# Patient Record
Sex: FEMALE | Race: WHITE | Employment: OTHER | URBAN - METROPOLITAN AREA
[De-identification: names, ages, dates, MRNs, and addresses within clinical notes are randomized per-mention and may not be internally consistent; named-entity substitution may affect disease eponyms.]

---

## 2017-02-09 ENCOUNTER — HOSPITAL ENCOUNTER (OUTPATIENT)
Dept: ULTRASOUND IMAGING | Age: 62
Discharge: HOME | End: 2017-02-09
Attending: INTERNAL MEDICINE
Payer: COMMERCIAL

## 2017-02-09 DIAGNOSIS — R09.89 RIGHT CAROTID BRUIT: ICD-10-CM

## 2017-02-09 PROCEDURE — 93880 EXTRACRANIAL BILAT STUDY: CPT

## 2017-02-24 ENCOUNTER — HOSPITAL ENCOUNTER (OUTPATIENT)
Dept: MAMMOGRAPHY | Age: 62
Discharge: HOME | End: 2017-02-24
Attending: INTERNAL MEDICINE
Payer: COMMERCIAL

## 2017-02-24 DIAGNOSIS — Z12.31 ENCOUNTER FOR SCREENING MAMMOGRAM FOR MALIGNANT NEOPLASM OF BREAST: ICD-10-CM

## 2017-02-24 PROCEDURE — 77067 SCR MAMMO BI INCL CAD: CPT

## 2017-05-17 PROCEDURE — 88305 TISSUE EXAM BY PATHOLOGIST: CPT | Performed by: INTERNAL MEDICINE

## 2017-10-10 ENCOUNTER — IMMUNIZATION (OUTPATIENT)
Dept: FAMILY MEDICINE CLINIC | Facility: CLINIC | Age: 62
End: 2017-10-10

## 2017-10-10 DIAGNOSIS — Z23 NEED FOR VACCINATION: ICD-10-CM

## 2017-10-10 PROCEDURE — 90686 IIV4 VACC NO PRSV 0.5 ML IM: CPT | Performed by: NURSE PRACTITIONER

## 2017-10-10 PROCEDURE — 90471 IMMUNIZATION ADMIN: CPT | Performed by: NURSE PRACTITIONER

## 2018-03-05 ENCOUNTER — OFFICE VISIT (OUTPATIENT)
Dept: FAMILY MEDICINE CLINIC | Facility: CLINIC | Age: 63
End: 2018-03-05

## 2018-03-05 VITALS
BODY MASS INDEX: 25.27 KG/M2 | TEMPERATURE: 98 F | WEIGHT: 161 LBS | SYSTOLIC BLOOD PRESSURE: 143 MMHG | HEIGHT: 67 IN | HEART RATE: 71 BPM | DIASTOLIC BLOOD PRESSURE: 84 MMHG

## 2018-03-05 DIAGNOSIS — E03.9 HYPOTHYROIDISM, UNSPECIFIED TYPE: ICD-10-CM

## 2018-03-05 DIAGNOSIS — Z12.31 SCREENING MAMMOGRAM, ENCOUNTER FOR: ICD-10-CM

## 2018-03-05 DIAGNOSIS — R03.0 ELEVATED BLOOD PRESSURE READING: ICD-10-CM

## 2018-03-05 DIAGNOSIS — E78.5 HYPERLIPIDEMIA, UNSPECIFIED HYPERLIPIDEMIA TYPE: Primary | ICD-10-CM

## 2018-03-05 PROCEDURE — 99212 OFFICE O/P EST SF 10 MIN: CPT | Performed by: FAMILY MEDICINE

## 2018-03-05 PROCEDURE — 99203 OFFICE O/P NEW LOW 30 MIN: CPT | Performed by: FAMILY MEDICINE

## 2018-03-05 RX ORDER — OMEGA-3-ACID ETHYL ESTERS 1 G/1
1 CAPSULE, LIQUID FILLED ORAL 2 TIMES DAILY
COMMUNITY

## 2018-03-05 RX ORDER — LEVOTHYROXINE SODIUM 0.12 MG/1
125 TABLET ORAL
Qty: 30 TABLET | Refills: 11 | Status: SHIPPED | OUTPATIENT
Start: 2018-03-05 | End: 2019-03-06

## 2018-03-05 RX ORDER — ATORVASTATIN CALCIUM 20 MG/1
20 TABLET, FILM COATED ORAL NIGHTLY
Qty: 30 TABLET | Refills: 11 | Status: SHIPPED | OUTPATIENT
Start: 2018-03-05 | End: 2019-03-15

## 2018-03-05 RX ORDER — ATORVASTATIN CALCIUM 20 MG/1
20 TABLET, FILM COATED ORAL NIGHTLY
COMMUNITY
End: 2018-03-05

## 2018-03-05 RX ORDER — LEVOTHYROXINE SODIUM 0.12 MG/1
125 TABLET ORAL
COMMUNITY
End: 2018-03-05

## 2018-03-05 NOTE — PROGRESS NOTES
Aren Petty is a 57year old female. Patient presents with:  Hyperlipidemia: New patient and medication refill    HPI:   Here for regular new patient - needs refills. Exercises regularly now since retired. Denies any SOB, CP  Has no complaints. Future  - VITAMIN D, 25-HYDROXY; Future  - ASSAY, THYROID STIM HORMONE; Future  - FREE T4 (FREE THYROXINE); Future    3. Screening mammogram, encounter for    - [facility] SCREENING BILAT (CPT=77067); Future    4.  Elevated blood pressure reading  BP elevated - di

## 2018-03-09 ENCOUNTER — LAB ENCOUNTER (OUTPATIENT)
Dept: LAB | Age: 63
End: 2018-03-09
Attending: FAMILY MEDICINE
Payer: COMMERCIAL

## 2018-03-09 DIAGNOSIS — E03.9 HYPOTHYROIDISM, UNSPECIFIED TYPE: ICD-10-CM

## 2018-03-09 DIAGNOSIS — E78.5 HYPERLIPIDEMIA, UNSPECIFIED HYPERLIPIDEMIA TYPE: ICD-10-CM

## 2018-03-09 LAB
ALBUMIN SERPL BCP-MCNC: 4.3 G/DL (ref 3.5–4.8)
ALBUMIN/GLOB SERPL: 1.5 {RATIO} (ref 1–2)
ALP SERPL-CCNC: 58 U/L (ref 32–100)
ALT SERPL-CCNC: 20 U/L (ref 14–54)
ANION GAP SERPL CALC-SCNC: 9 MMOL/L (ref 0–18)
AST SERPL-CCNC: 22 U/L (ref 15–41)
BASOPHILS # BLD: 0 K/UL (ref 0–0.2)
BASOPHILS NFR BLD: 1 %
BILIRUB SERPL-MCNC: 0.9 MG/DL (ref 0.3–1.2)
BUN SERPL-MCNC: 20 MG/DL (ref 8–20)
BUN/CREAT SERPL: 17.4 (ref 10–20)
CALCIUM SERPL-MCNC: 10 MG/DL (ref 8.5–10.5)
CHLORIDE SERPL-SCNC: 103 MMOL/L (ref 95–110)
CHOLEST SERPL-MCNC: 167 MG/DL (ref 110–200)
CO2 SERPL-SCNC: 29 MMOL/L (ref 22–32)
CREAT SERPL-MCNC: 1.15 MG/DL (ref 0.5–1.5)
EOSINOPHIL # BLD: 0.1 K/UL (ref 0–0.7)
EOSINOPHIL NFR BLD: 2 %
ERYTHROCYTE [DISTWIDTH] IN BLOOD BY AUTOMATED COUNT: 13.4 % (ref 11–15)
GLOBULIN PLAS-MCNC: 2.8 G/DL (ref 2.5–3.7)
GLUCOSE SERPL-MCNC: 103 MG/DL (ref 70–99)
HCT VFR BLD AUTO: 43.2 % (ref 35–48)
HDLC SERPL-MCNC: 37 MG/DL
HGB BLD-MCNC: 14.5 G/DL (ref 12–16)
LDLC SERPL CALC-MCNC: 98 MG/DL (ref 0–99)
LYMPHOCYTES # BLD: 2 K/UL (ref 1–4)
LYMPHOCYTES NFR BLD: 27 %
MCH RBC QN AUTO: 29.9 PG (ref 27–32)
MCHC RBC AUTO-ENTMCNC: 33.6 G/DL (ref 32–37)
MCV RBC AUTO: 89 FL (ref 80–100)
MONOCYTES # BLD: 0.5 K/UL (ref 0–1)
MONOCYTES NFR BLD: 6 %
NEUTROPHILS # BLD AUTO: 4.8 K/UL (ref 1.8–7.7)
NEUTROPHILS NFR BLD: 65 %
NONHDLC SERPL-MCNC: 130 MG/DL
OSMOLALITY UR CALC.SUM OF ELEC: 295 MOSM/KG (ref 275–295)
PATIENT FASTING: YES
PLATELET # BLD AUTO: 219 K/UL (ref 140–400)
PMV BLD AUTO: 8.7 FL (ref 7.4–10.3)
POTASSIUM SERPL-SCNC: 4 MMOL/L (ref 3.3–5.1)
PROT SERPL-MCNC: 7.1 G/DL (ref 5.9–8.4)
RBC # BLD AUTO: 4.86 M/UL (ref 3.7–5.4)
SODIUM SERPL-SCNC: 141 MMOL/L (ref 136–144)
T4 FREE SERPL-MCNC: 1.47 NG/DL (ref 0.58–1.64)
TRIGL SERPL-MCNC: 161 MG/DL (ref 1–149)
TSH SERPL-ACNC: 1.84 UIU/ML (ref 0.45–5.33)
VIT B12 SERPL-MCNC: 326 PG/ML (ref 181–914)
WBC # BLD AUTO: 7.5 K/UL (ref 4–11)

## 2018-03-09 PROCEDURE — 82306 VITAMIN D 25 HYDROXY: CPT

## 2018-03-09 PROCEDURE — 84443 ASSAY THYROID STIM HORMONE: CPT

## 2018-03-09 PROCEDURE — 80061 LIPID PANEL: CPT

## 2018-03-09 PROCEDURE — 36415 COLL VENOUS BLD VENIPUNCTURE: CPT

## 2018-03-09 PROCEDURE — 85025 COMPLETE CBC W/AUTO DIFF WBC: CPT

## 2018-03-09 PROCEDURE — 80050 GENERAL HEALTH PANEL: CPT

## 2018-03-09 PROCEDURE — 84439 ASSAY OF FREE THYROXINE: CPT

## 2018-03-09 PROCEDURE — 82607 VITAMIN B-12: CPT

## 2018-03-11 NOTE — PROGRESS NOTES
Tests are all stable. Please continue with current treatment plan.  Continue current medications. - Dr. Briana Goss

## 2018-03-12 LAB — 25(OH)D3 SERPL-MCNC: 32.5 NG/ML

## 2018-08-01 ENCOUNTER — TELEPHONE (OUTPATIENT)
Dept: FAMILY MEDICINE CLINIC | Facility: CLINIC | Age: 63
End: 2018-08-01

## 2018-08-01 DIAGNOSIS — Z78.0 POST-MENOPAUSAL: Primary | ICD-10-CM

## 2018-08-01 NOTE — TELEPHONE ENCOUNTER
[facility] asking for a order for pt to have Her Dexa Scan but there Is no order on her chart       Please advise

## 2018-08-13 ENCOUNTER — HOSPITAL ENCOUNTER (OUTPATIENT)
Dept: BONE DENSITY | Age: 63
Discharge: HOME | End: 2018-08-13
Attending: FAMILY MEDICINE
Payer: MEDICARE

## 2018-08-13 DIAGNOSIS — Z78.0 POST-MENOPAUSAL: ICD-10-CM

## 2018-08-13 PROCEDURE — 77080 DXA BONE DENSITY AXIAL: CPT | Performed by: FAMILY MEDICINE

## 2018-08-14 ENCOUNTER — HOSPITAL ENCOUNTER (OUTPATIENT)
Dept: MAMMOGRAPHY | Age: 63
Discharge: HOME | End: 2018-08-14
Attending: FAMILY MEDICINE
Payer: MEDICARE

## 2018-08-14 DIAGNOSIS — Z12.39 BREAST CANCER SCREENING: ICD-10-CM

## 2018-08-14 DIAGNOSIS — Z12.31 SCREENING MAMMOGRAM, ENCOUNTER FOR: ICD-10-CM

## 2018-08-14 PROCEDURE — 77067 SCR MAMMO BI INCL CAD: CPT | Performed by: FAMILY MEDICINE

## 2018-08-16 NOTE — PROGRESS NOTES
Dexascan shows mild bone loss. Continue with calcium twice a day and vitamin D supplements.  Also increase weight bearing exercise like walking, aerobics. - Dr. Raquel Roman

## 2018-08-21 ENCOUNTER — LAB ENCOUNTER (OUTPATIENT)
Dept: LAB | Age: 63
End: 2018-08-21
Attending: FAMILY MEDICINE
Payer: MEDICARE

## 2018-08-21 ENCOUNTER — OFFICE VISIT (OUTPATIENT)
Dept: FAMILY MEDICINE CLINIC | Facility: CLINIC | Age: 63
End: 2018-08-21
Payer: MEDICARE

## 2018-08-21 VITALS
HEIGHT: 65 IN | HEART RATE: 60 BPM | DIASTOLIC BLOOD PRESSURE: 76 MMHG | SYSTOLIC BLOOD PRESSURE: 136 MMHG | BODY MASS INDEX: 26.16 KG/M2 | WEIGHT: 157 LBS

## 2018-08-21 DIAGNOSIS — L98.9 SKIN LESION: ICD-10-CM

## 2018-08-21 DIAGNOSIS — E78.2 MIXED HYPERLIPIDEMIA: ICD-10-CM

## 2018-08-21 DIAGNOSIS — Z00.00 ENCOUNTER FOR ANNUAL HEALTH EXAMINATION: ICD-10-CM

## 2018-08-21 DIAGNOSIS — I83.813 VARICOSE VEINS OF BOTH LOWER EXTREMITIES WITH PAIN: ICD-10-CM

## 2018-08-21 DIAGNOSIS — Z23 NEED FOR VACCINATION: ICD-10-CM

## 2018-08-21 DIAGNOSIS — Z00.00 ROUTINE GENERAL MEDICAL EXAMINATION AT A HEALTH CARE FACILITY: Primary | ICD-10-CM

## 2018-08-21 DIAGNOSIS — E03.8 OTHER SPECIFIED HYPOTHYROIDISM: ICD-10-CM

## 2018-08-21 DIAGNOSIS — E03.8 OTHER SPECIFIED HYPOTHYROIDISM: Primary | ICD-10-CM

## 2018-08-21 LAB
ALBUMIN SERPL BCP-MCNC: 4.2 G/DL (ref 3.5–4.8)
ALBUMIN/GLOB SERPL: 1.4 {RATIO} (ref 1–2)
ALP SERPL-CCNC: 58 U/L (ref 32–100)
ALT SERPL-CCNC: 19 U/L (ref 14–54)
ANION GAP SERPL CALC-SCNC: 7 MMOL/L (ref 0–18)
AST SERPL-CCNC: 23 U/L (ref 15–41)
BILIRUB SERPL-MCNC: 0.8 MG/DL (ref 0.3–1.2)
BUN SERPL-MCNC: 20 MG/DL (ref 8–20)
BUN/CREAT SERPL: 16.9 (ref 10–20)
CALCIUM SERPL-MCNC: 10.1 MG/DL (ref 8.5–10.5)
CHLORIDE SERPL-SCNC: 106 MMOL/L (ref 95–110)
CHOLEST SERPL-MCNC: 164 MG/DL (ref 110–200)
CO2 SERPL-SCNC: 29 MMOL/L (ref 22–32)
CREAT SERPL-MCNC: 1.18 MG/DL (ref 0.5–1.5)
GLOBULIN PLAS-MCNC: 3.1 G/DL (ref 2.5–3.7)
GLUCOSE SERPL-MCNC: 94 MG/DL (ref 70–99)
HDLC SERPL-MCNC: 42 MG/DL
LDLC SERPL CALC-MCNC: 97 MG/DL (ref 0–99)
NONHDLC SERPL-MCNC: 122 MG/DL
OSMOLALITY UR CALC.SUM OF ELEC: 296 MOSM/KG (ref 275–295)
PATIENT FASTING: YES
POTASSIUM SERPL-SCNC: 4.2 MMOL/L (ref 3.3–5.1)
PROT SERPL-MCNC: 7.3 G/DL (ref 5.9–8.4)
SODIUM SERPL-SCNC: 142 MMOL/L (ref 136–144)
T4 FREE SERPL-MCNC: 1.3 NG/DL (ref 0.58–1.64)
TRIGL SERPL-MCNC: 123 MG/DL (ref 1–149)
TSH SERPL-ACNC: 0.24 UIU/ML (ref 0.45–5.33)

## 2018-08-21 PROCEDURE — 93005 ELECTROCARDIOGRAM TRACING: CPT

## 2018-08-21 PROCEDURE — 90670 PCV13 VACCINE IM: CPT | Performed by: FAMILY MEDICINE

## 2018-08-21 PROCEDURE — 80053 COMPREHEN METABOLIC PANEL: CPT

## 2018-08-21 PROCEDURE — 93010 ELECTROCARDIOGRAM REPORT: CPT | Performed by: FAMILY MEDICINE

## 2018-08-21 PROCEDURE — 84443 ASSAY THYROID STIM HORMONE: CPT

## 2018-08-21 PROCEDURE — G0009 ADMIN PNEUMOCOCCAL VACCINE: HCPCS | Performed by: FAMILY MEDICINE

## 2018-08-21 PROCEDURE — 80061 LIPID PANEL: CPT

## 2018-08-21 PROCEDURE — G0402 INITIAL PREVENTIVE EXAM: HCPCS | Performed by: FAMILY MEDICINE

## 2018-08-21 PROCEDURE — 84439 ASSAY OF FREE THYROXINE: CPT

## 2018-08-21 PROCEDURE — 36415 COLL VENOUS BLD VENIPUNCTURE: CPT

## 2018-08-21 NOTE — PROGRESS NOTES
HPI:   Cristian Rowell is a 70year old female who presents for a Medicare Initial Annual Wellness visit (Once after 12 month Medicare anniversary) . Pt here for regular physical - first medicare physical. Stays active with grand kids.  Taking yoga on Encounters:  08/21/18 : 157 lb (71.2 kg)  03/05/18 : 161 lb (73 kg)  04/06/17 : 155 lb (70.3 kg)     Last Cholesterol Labs:     Lab Results  Component Value Date   CHOLEST 167 03/09/2018   HDL 37 03/09/2018   LDL 98 03/09/2018   TRIG 161 (H) 03/09/2018 exertion  CARDIOVASCULAR: denies chest pain on exertion  GI: denies abdominal pain, denies heartburn  : denies dysuria, vaginal discharge or itching, no complaint of urinary incontinence   MUSCULOSKELETAL: denies back pain  NEURO: denies headaches  PSYCH Visual Acuity                           General Appearance:  Alert, cooperative, no distress, appears stated age   Head:  Normocephalic, without obvious abnormality, atraumatic   Eyes:  PERRL, conjunctiva/corneas clear, EOM's intact both eyes   Ears: FOR INITIAL PREVENT EXAM    4. Need for vaccination    - PNEUMOCOCCAL VACC, 13 KAYDEN IM    5. Varicose veins of both lower extremities with pain  Compression stockings and referral for treatment.    - OP REFERRAL TO INTERVENTIONAL RADIOLOGY    6. Skin lesion flowsheet data found. Glaucoma Screening      Ophthalmology Visit Annually: Diabetics, FHx Glaucoma, AA>50, > 65 No flowsheet data found.     Bone Density Screening      Dexascan Every two years Last Dexa Scan:   XR DEXA BONE DENSITOMETRY (CPT=77

## 2018-08-21 NOTE — PATIENT INSTRUCTIONS
Nneka Britt's SCREENING SCHEDULE   Tests on this list are recommended by your physician but may not be covered, or covered at this frequency, by your insurer. Please check with your insurance carrier before scheduling to verify coverage.    JEREMÍAS more often if abnormal Colonoscopy due on 05/17/2022 Update Health Maintenance if applicable    Flex Sigmoidoscopy Screen  Covered every 5 years No results found for this or any previous visit. No flowsheet data found.      Fecal Occult Blood   Covered Joelle Pneumococcal 23 (Pneumovax)  Covered Once after 65 No orders found for this or any previous visit. Please get once after your 65th birthday    Hepatitis B for Moderate/High Risk       No orders found for this or any previous visit.  Medium/high risk factors

## 2018-08-26 NOTE — PROGRESS NOTES
Labs are all stable. TSH is a bit low meaning your thyroid dose may be too high. I do not want to adjust it yet but will recheck labs in 2-3 months for the thyroid to decide. I placed the orders.  Your cholesterol is stable on the current medications. -

## 2018-08-30 ENCOUNTER — TELEPHONE (OUTPATIENT)
Dept: OTHER | Age: 63
End: 2018-08-30

## 2018-08-31 NOTE — TELEPHONE ENCOUNTER
Referral Request     From  Marisol Saldivar To Sent  8/30/2018  8:35 AM   I am waiting for authorization numbers for OP Interventional Radiology for varicose veins and for Dr. Jeremias Morse, dermatology.  I have Medicare A and B and [facility] in

## 2018-09-04 NOTE — TELEPHONE ENCOUNTER
Looks like both authorized. I do not think she needs a number now - please look at referrals and confirm that and call pt.

## 2018-09-06 NOTE — TELEPHONE ENCOUNTER
Called pt and lmtcb, she does not need any authorization to see both specialist as along as they accept her insurance. She can call to make appt with each doctor.

## 2018-09-07 NOTE — TELEPHONE ENCOUNTER
Patient returned call, advised of notes below with understanding. No further questions at this time.

## 2018-10-01 ENCOUNTER — IMMUNIZATION (OUTPATIENT)
Dept: FAMILY MEDICINE CLINIC | Facility: CLINIC | Age: 63
End: 2018-10-01
Payer: MEDICARE

## 2018-10-01 PROCEDURE — 90653 IIV ADJUVANT VACCINE IM: CPT | Performed by: FAMILY MEDICINE

## 2018-10-01 PROCEDURE — G0008 ADMIN INFLUENZA VIRUS VAC: HCPCS | Performed by: FAMILY MEDICINE

## 2018-10-26 DIAGNOSIS — I83.813 VARICOSE VEINS OF BOTH LOWER EXTREMITIES WITH PAIN: Primary | ICD-10-CM

## 2018-10-30 ENCOUNTER — OFFICE VISIT (OUTPATIENT)
Dept: DERMATOLOGY CLINIC | Facility: CLINIC | Age: 63
End: 2018-10-30
Payer: MEDICARE

## 2018-10-30 DIAGNOSIS — D48.5 NEOPLASM OF UNCERTAIN BEHAVIOR OF SKIN: Primary | ICD-10-CM

## 2018-10-30 PROCEDURE — 11100 BIOPSY OF SKIN LESION: CPT | Performed by: DERMATOLOGY

## 2018-10-30 PROCEDURE — 88305 TISSUE EXAM BY PATHOLOGIST: CPT | Performed by: DERMATOLOGY

## 2018-10-30 NOTE — PROGRESS NOTES
Past Medical History:   Diagnosis Date   • Hyperlipidemia    • Hypothyroid      Past Surgical History:   Procedure Laterality Date   • COLONOSCOPY  5/17   • HYSTERECTOMY  12/2013   • OTHER SURGICAL HISTORY  1989    laparascopy   • TONSILLECTOMY  1959 S

## 2018-10-30 NOTE — PROGRESS NOTES
HPI:     Chief Complaint     Derm Problem        HPI     Derm Problem      Additional comments: New pt. Pt presents with red spot on right sideburn, present x 6 months. Pt states she has accidentally scratched it with hairbrush.  Pt denies personal and fami name: Not on file      Number of children: Not on file      Years of education: Not on file      Highest education level: Not on file    Social Needs      Financial resource strain: Not on file      Food insecurity - worry: Not on file      Food insecurity [IHP Pt to [place]      Results From Past 48 Hours:  No results found for this or any previous visit (from the past 48 hour(s)).     Meds This Visit:      Imaging Orders:  None     Referral Orders:  No orders of the defined types were placed in this enc

## 2018-11-02 NOTE — PROGRESS NOTES
Logged in path book and added to pmh. Pt informed of lab results and lss reccs. Voiced understanding. No questions at this time.

## 2018-11-12 ENCOUNTER — OFFICE VISIT (OUTPATIENT)
Dept: SURGERY | Facility: CLINIC | Age: 63
End: 2018-11-12
Payer: MEDICARE

## 2018-11-12 DIAGNOSIS — C44.319 BASAL CELL CARCINOMA OF SKIN OF OTHER PARTS OF FACE: Primary | ICD-10-CM

## 2018-11-12 PROCEDURE — 99203 OFFICE O/P NEW LOW 30 MIN: CPT | Performed by: PLASTIC SURGERY

## 2018-11-12 PROCEDURE — G0463 HOSPITAL OUTPT CLINIC VISIT: HCPCS | Performed by: PLASTIC SURGERY

## 2018-11-12 RX ORDER — HYDROCODONE BITARTRATE AND ACETAMINOPHEN 7.5; 325 MG/1; MG/1
1 TABLET ORAL
Qty: 10 TABLET | Refills: 0 | Status: SHIPPED | OUTPATIENT
Start: 2018-11-12 | End: 2018-12-07

## 2018-11-12 NOTE — PROGRESS NOTES
Pt request for surgery signed by pt and witnessed and signed by RN. Prescription for Norco 7.5 mg and narcotic hand-out instruction sheet given to and reviewed w/patient. Pre-Surgical Instruction Handout given to and reviewed w/pt.   All questions and con

## 2018-11-12 NOTE — H&P (VIEW-ONLY)
Dani Martinez is a 70year old female that presents with Patient presents with:  Lesion: R cheek, BCC  .     REFERRED BY:  Lucho Terry    Pacemaker: No  Latex Allergy: no  Coumadin: No  Plavix: No  Other anticoagulants: No  Cardiac stents: No    HAND D Hyperlipidemia    • Hypothyroid      Past Surgical History:   Procedure Laterality Date   • COLONOSCOPY  5/17   • HYSTERECTOMY  12/2013   • OTHER SURGICAL HISTORY  1989    laparascopy   • TONSILLECTOMY  1959     Social History    Socioeconomic History Left: Normal  NECK/THYROID: Inspection - Normal, Palpation - Normal, Thyroid gland - Normal, No adenopathy  RESPIRATORY: Inspection - Normal, Effort - Normal  CARDIOVASCULAR: Regular rhythm, No murmurs  ABDOMEN: Inspection - Normal, No abdominal tenderness

## 2018-11-12 NOTE — H&P
Chantelle Fernandez is a 70year old female that presents with Patient presents with:  Lesion: R cheek, BCC  .     REFERRED BY:  Antonia Reina    Pacemaker: No  Latex Allergy: no  Coumadin: No  Plavix: No  Other anticoagulants: No  Cardiac stents: No    HAND D Hyperlipidemia    • Hypothyroid      Past Surgical History:   Procedure Laterality Date   • COLONOSCOPY  5/17   • HYSTERECTOMY  12/2013   • OTHER SURGICAL HISTORY  1989    laparascopy   • TONSILLECTOMY  1959     Social History    Socioeconomic History Left: Normal  NECK/THYROID: Inspection - Normal, Palpation - Normal, Thyroid gland - Normal, No adenopathy  RESPIRATORY: Inspection - Normal, Effort - Normal  CARDIOVASCULAR: Regular rhythm, No murmurs  ABDOMEN: Inspection - Normal, No abdominal tenderness

## 2018-11-16 ENCOUNTER — HOSPITAL ENCOUNTER (OUTPATIENT)
Dept: ULTRASOUND IMAGING | Facility: HOSPITAL | Age: 63
Discharge: HOME | End: 2018-11-16
Attending: RADIOLOGY
Payer: MEDICARE

## 2018-11-16 DIAGNOSIS — I83.813 VARICOSE VEINS OF BOTH LOWER EXTREMITIES WITH PAIN: ICD-10-CM

## 2018-11-16 PROCEDURE — 93970 EXTREMITY STUDY: CPT | Performed by: RADIOLOGY

## 2018-11-26 ENCOUNTER — TELEPHONE (OUTPATIENT)
Dept: SURGERY | Facility: CLINIC | Age: 63
End: 2018-11-26

## 2018-11-26 DIAGNOSIS — C44.319 BASAL CELL CARCINOMA (BCC) OF SKIN OF OTHER PART OF FACE: Primary | ICD-10-CM

## 2018-11-26 NOTE — TELEPHONE ENCOUNTER
Surgery is scheduled for 12/4/18  Insurance is [employer] [facility]  Thank you!

## 2018-11-26 NOTE — TELEPHONE ENCOUNTER
Surgery is scheduled for 12/4/18  Insurance is Lebron Perez and [facility]  Thank you!

## 2018-12-04 ENCOUNTER — ANESTHESIA EVENT (OUTPATIENT)
Dept: SURGERY | Facility: HOSPITAL | Age: 63
End: 2018-12-04
Payer: MEDICARE

## 2018-12-04 ENCOUNTER — HOSPITAL DOCUMENTATION (OUTPATIENT)
Dept: SURGERY | Facility: CLINIC | Age: 63
End: 2018-12-04

## 2018-12-04 ENCOUNTER — HOSPITAL ENCOUNTER (OUTPATIENT)
Facility: HOSPITAL | Age: 63
Setting detail: HOSPITAL OUTPATIENT SURGERY
Discharge: HOME | End: 2018-12-04
Attending: PLASTIC SURGERY | Admitting: PLASTIC SURGERY
Payer: MEDICARE

## 2018-12-04 ENCOUNTER — ANESTHESIA (OUTPATIENT)
Dept: SURGERY | Facility: HOSPITAL | Age: 63
End: 2018-12-04
Payer: MEDICARE

## 2018-12-04 VITALS
DIASTOLIC BLOOD PRESSURE: 87 MMHG | RESPIRATION RATE: 15 BRPM | HEART RATE: 59 BPM | WEIGHT: 159 LBS | HEIGHT: 65.5 IN | SYSTOLIC BLOOD PRESSURE: 113 MMHG | TEMPERATURE: 98 F | BODY MASS INDEX: 26.17 KG/M2 | OXYGEN SATURATION: 95 %

## 2018-12-04 DIAGNOSIS — C44.319 BASAL CELL CARCINOMA (BCC) OF SKIN OF OTHER PART OF FACE: Primary | ICD-10-CM

## 2018-12-04 PROCEDURE — 14041 TIS TRNFR F/C/C/M/N/A/G/H/F: CPT | Performed by: PLASTIC SURGERY

## 2018-12-04 PROCEDURE — 0HX1XZZ TRANSFER FACE SKIN, EXTERNAL APPROACH: ICD-10-PCS | Performed by: PLASTIC SURGERY

## 2018-12-04 PROCEDURE — 0HB1XZZ EXCISION OF FACE SKIN, EXTERNAL APPROACH: ICD-10-PCS | Performed by: PLASTIC SURGERY

## 2018-12-04 RX ORDER — FAMOTIDINE 20 MG/1
20 TABLET ORAL ONCE
Status: DISCONTINUED | OUTPATIENT
Start: 2018-12-04 | End: 2018-12-04 | Stop reason: HOSPADM

## 2018-12-04 RX ORDER — MORPHINE SULFATE 10 MG/ML
6 INJECTION, SOLUTION INTRAMUSCULAR; INTRAVENOUS EVERY 10 MIN PRN
Status: DISCONTINUED | OUTPATIENT
Start: 2018-12-04 | End: 2018-12-04

## 2018-12-04 RX ORDER — HYDROCODONE BITARTRATE AND ACETAMINOPHEN 7.5; 325 MG/1; MG/1
1 TABLET ORAL EVERY 4 HOURS PRN
Status: DISCONTINUED | OUTPATIENT
Start: 2018-12-04 | End: 2018-12-04

## 2018-12-04 RX ORDER — KETOROLAC TROMETHAMINE 30 MG/ML
INJECTION, SOLUTION INTRAMUSCULAR; INTRAVENOUS AS NEEDED
Status: DISCONTINUED | OUTPATIENT
Start: 2018-12-04 | End: 2018-12-04 | Stop reason: SURG

## 2018-12-04 RX ORDER — LIDOCAINE HYDROCHLORIDE 10 MG/ML
INJECTION, SOLUTION EPIDURAL; INFILTRATION; INTRACAUDAL; PERINEURAL AS NEEDED
Status: DISCONTINUED | OUTPATIENT
Start: 2018-12-04 | End: 2018-12-04 | Stop reason: SURG

## 2018-12-04 RX ORDER — ACETAMINOPHEN 500 MG
1000 TABLET ORAL ONCE
Status: COMPLETED | OUTPATIENT
Start: 2018-12-04 | End: 2018-12-04

## 2018-12-04 RX ORDER — ONDANSETRON 2 MG/ML
4 INJECTION INTRAMUSCULAR; INTRAVENOUS ONCE AS NEEDED
Status: DISCONTINUED | OUTPATIENT
Start: 2018-12-04 | End: 2018-12-04

## 2018-12-04 RX ORDER — HALOPERIDOL 5 MG/ML
0.25 INJECTION INTRAMUSCULAR ONCE AS NEEDED
Status: DISCONTINUED | OUTPATIENT
Start: 2018-12-04 | End: 2018-12-04

## 2018-12-04 RX ORDER — MORPHINE SULFATE 4 MG/ML
2 INJECTION, SOLUTION INTRAMUSCULAR; INTRAVENOUS EVERY 10 MIN PRN
Status: DISCONTINUED | OUTPATIENT
Start: 2018-12-04 | End: 2018-12-04

## 2018-12-04 RX ORDER — SODIUM CHLORIDE, SODIUM LACTATE, POTASSIUM CHLORIDE, CALCIUM CHLORIDE 600; 310; 30; 20 MG/100ML; MG/100ML; MG/100ML; MG/100ML
INJECTION, SOLUTION INTRAVENOUS CONTINUOUS
Status: DISCONTINUED | OUTPATIENT
Start: 2018-12-04 | End: 2018-12-04

## 2018-12-04 RX ORDER — NALOXONE HYDROCHLORIDE 0.4 MG/ML
80 INJECTION, SOLUTION INTRAMUSCULAR; INTRAVENOUS; SUBCUTANEOUS AS NEEDED
Status: DISCONTINUED | OUTPATIENT
Start: 2018-12-04 | End: 2018-12-04

## 2018-12-04 RX ORDER — METOCLOPRAMIDE 10 MG/1
10 TABLET ORAL ONCE
Status: DISCONTINUED | OUTPATIENT
Start: 2018-12-04 | End: 2018-12-04 | Stop reason: HOSPADM

## 2018-12-04 RX ORDER — MORPHINE SULFATE 4 MG/ML
4 INJECTION, SOLUTION INTRAMUSCULAR; INTRAVENOUS EVERY 10 MIN PRN
Status: DISCONTINUED | OUTPATIENT
Start: 2018-12-04 | End: 2018-12-04

## 2018-12-04 RX ORDER — HYDROCODONE BITARTRATE AND ACETAMINOPHEN 5; 325 MG/1; MG/1
2 TABLET ORAL AS NEEDED
Status: DISCONTINUED | OUTPATIENT
Start: 2018-12-04 | End: 2018-12-04

## 2018-12-04 RX ORDER — HYDROCODONE BITARTRATE AND ACETAMINOPHEN 5; 325 MG/1; MG/1
1 TABLET ORAL AS NEEDED
Status: DISCONTINUED | OUTPATIENT
Start: 2018-12-04 | End: 2018-12-04

## 2018-12-04 RX ORDER — LIDOCAINE HYDROCHLORIDE AND EPINEPHRINE 5; 5 MG/ML; UG/ML
INJECTION, SOLUTION INFILTRATION; PERINEURAL AS NEEDED
Status: DISCONTINUED | OUTPATIENT
Start: 2018-12-04 | End: 2018-12-04 | Stop reason: HOSPADM

## 2018-12-04 RX ADMIN — KETOROLAC TROMETHAMINE 30 MG: 30 INJECTION, SOLUTION INTRAMUSCULAR; INTRAVENOUS at 12:01:00

## 2018-12-04 RX ADMIN — LIDOCAINE HYDROCHLORIDE 50 MG: 10 INJECTION, SOLUTION EPIDURAL; INFILTRATION; INTRACAUDAL; PERINEURAL at 10:52:00

## 2018-12-04 RX ADMIN — SODIUM CHLORIDE, SODIUM LACTATE, POTASSIUM CHLORIDE, CALCIUM CHLORIDE: 600; 310; 30; 20 INJECTION, SOLUTION INTRAVENOUS at 10:50:00

## 2018-12-04 NOTE — ANESTHESIA PREPROCEDURE EVALUATION
Anesthesia PreOp Note    HPI:     Vanessa Peraza is a 70year old female who presents for preoperative consultation requested by: Manju Walden MD    Date of Surgery: 12/4/2018    Procedure(s):  EXCISION LESION HEAD/NECK/FACE  Indication: Basal Current Facility-Administered Medications Ordered in Epic:  lactated ringers infusion  Intravenous Continuous Roxanne Franco MD Last Rate: 20 mL/hr at 12/04/18 0917   famoTIDine (PEPCID) tab 20 mg 20 mg Oral Once Roxanne Franco MD History Narrative      Not on file      Available pre-op labs reviewed. Vital Signs: Body mass index is 26.06 kg/m². height is 1.664 m (5' 5.5\") and weight is 72.1 kg (159 lb). Her oral temperature is 98.1 °F (36.7 °C).  Her blood pressure i

## 2018-12-04 NOTE — ANESTHESIA POSTPROCEDURE EVALUATION
Patient: Halie Armas    Procedure Summary     Date:  12/04/18 Room / Location:  [facility] MAIN OR  / [facility] MAIN OR    Anesthesia Start:  9473 Anesthesia Stop:  6661    Procedure:  EXCISION LESION HEAD/NECK/FACE (Right Face) Diagnosis:  (Basal cell carcinoma

## 2018-12-04 NOTE — BRIEF OP NOTE
Pre-Operative Diagnosis: Basal cell carcinoma     Post-Operative Diagnosis: Basal cell carcinoma      Procedure Performed:   Procedure(s):   Wide excision lesion(s) right cheek, frozen section, flap reconstruction    Surgeon(s) and Role:     * Madeline BENTON

## 2018-12-04 NOTE — OPERATIVE REPORT
[facility]    PATIENT'S NAME: Tracy Hull   ATTENDING PHYSICIAN: Mary Kate Patten MD   OPERATING PHYSICIAN: Mary Kate Patten MD   PATIENT ACCOUNT#:   [id number]    LOCATION:  [place] [address]  MEDICAL RECORD #:   [id number] 12:04:26  t: 12/04/2018 13:19:21  [facility] [id number]  [facility]/    cc: MD Harry Biswas. Mesfin Gregorio, [address] [place] [address]  Briana Goss MD

## 2018-12-05 ENCOUNTER — TELEPHONE (OUTPATIENT)
Dept: SURGERY | Facility: CLINIC | Age: 63
End: 2018-12-05

## 2018-12-05 NOTE — TELEPHONE ENCOUNTER
Spoke with pt \"doing well\"  Has generalized edema and soreness to surgical site R cheek  Denies pain, did not take norco, last dose tylenol 500mg by mouth last night  Denies s/s infection, no bleeding to surgical site, steri strip dry and intact  Made aw

## 2018-12-07 ENCOUNTER — NURSE ONLY (OUTPATIENT)
Dept: SURGERY | Facility: CLINIC | Age: 63
End: 2018-12-07
Payer: MEDICARE

## 2018-12-07 DIAGNOSIS — C44.319 BASAL CELL CARCINOMA (BCC) OF SKIN OF OTHER PART OF FACE: ICD-10-CM

## 2018-12-07 DIAGNOSIS — Z48.02 ENCOUNTER FOR REMOVAL OF SUTURES: Primary | ICD-10-CM

## 2018-12-07 PROCEDURE — G0463 HOSPITAL OUTPT CLINIC VISIT: HCPCS | Performed by: PLASTIC SURGERY

## 2018-12-07 NOTE — PROGRESS NOTES
Surgery 1: R cheek BCC  - Date: 12/04/18  - Days Since: 3    Pt here for suture removal to R cheek . Pt identified w/2 identifiers and orders verified. Pt presents w/steri-strip C/D/I. Pt denies c/o pain, use of analgesics, and s/s infection.   Steri-st

## 2019-01-29 DIAGNOSIS — I87.2 VENOUS INSUFFICIENCY: Primary | ICD-10-CM

## 2019-02-21 RX ORDER — IBUPROFEN 800 MG
1 TABLET ORAL DAILY
COMMUNITY

## 2019-02-22 ENCOUNTER — HOSPITAL ENCOUNTER (OUTPATIENT)
Dept: INTERVENTIONAL RADIOLOGY/VASCULAR | Facility: HOSPITAL | Age: 64
Discharge: HOME | End: 2019-02-22
Attending: RADIOLOGY | Admitting: RADIOLOGY
Payer: MEDICARE

## 2019-02-22 VITALS
HEIGHT: 65 IN | BODY MASS INDEX: 26.66 KG/M2 | SYSTOLIC BLOOD PRESSURE: 139 MMHG | RESPIRATION RATE: 16 BRPM | WEIGHT: 160 LBS | OXYGEN SATURATION: 96 % | HEART RATE: 75 BPM | DIASTOLIC BLOOD PRESSURE: 74 MMHG

## 2019-02-22 DIAGNOSIS — I87.2 VENOUS INSUFFICIENCY: ICD-10-CM

## 2019-02-22 PROCEDURE — 99153 MOD SED SAME PHYS/QHP EA: CPT

## 2019-02-22 PROCEDURE — 3E033TZ INTRODUCTION OF DESTRUCTIVE AGENT INTO PERIPHERAL VEIN, PERCUTANEOUS APPROACH: ICD-10-PCS | Performed by: RADIOLOGY

## 2019-02-22 PROCEDURE — 99152 MOD SED SAME PHYS/QHP 5/>YRS: CPT

## 2019-02-22 PROCEDURE — 76942 ECHO GUIDE FOR BIOPSY: CPT

## 2019-02-22 PROCEDURE — 06DY3ZZ EXTRACTION OF LOWER VEIN, PERCUTANEOUS APPROACH: ICD-10-PCS | Performed by: RADIOLOGY

## 2019-02-22 PROCEDURE — 36470 NJX SCLRSNT 1 INCMPTNT VEIN: CPT

## 2019-02-22 PROCEDURE — 37765 STAB PHLEB VEINS XTR 10-20: CPT

## 2019-02-22 PROCEDURE — 36415 COLL VENOUS BLD VENIPUNCTURE: CPT

## 2019-02-22 PROCEDURE — 96365 THER/PROPH/DIAG IV INF INIT: CPT

## 2019-02-22 RX ORDER — ONDANSETRON 2 MG/ML
INJECTION INTRAMUSCULAR; INTRAVENOUS
Status: COMPLETED
Start: 2019-02-22 | End: 2019-02-22

## 2019-02-22 RX ORDER — SODIUM TETRADECYL SULFATE 30 MG/ML
INJECTION, SOLUTION INTRAVENOUS
Status: COMPLETED
Start: 2019-02-22 | End: 2019-02-22

## 2019-02-22 RX ORDER — LIDOCAINE HYDROCHLORIDE 20 MG/ML
INJECTION, SOLUTION EPIDURAL; INFILTRATION; INTRACAUDAL; PERINEURAL
Status: COMPLETED
Start: 2019-02-22 | End: 2019-02-22

## 2019-02-22 RX ORDER — SODIUM CHLORIDE 9 MG/ML
INJECTION, SOLUTION INTRAVENOUS
Status: DISCONTINUED
Start: 2019-02-22 | End: 2019-02-22

## 2019-02-22 RX ORDER — MIDAZOLAM HYDROCHLORIDE 1 MG/ML
INJECTION INTRAMUSCULAR; INTRAVENOUS
Status: COMPLETED
Start: 2019-02-22 | End: 2019-02-22

## 2019-02-22 RX ORDER — SODIUM CHLORIDE 9 MG/ML
INJECTION, SOLUTION INTRAVENOUS CONTINUOUS
Status: DISCONTINUED | OUTPATIENT
Start: 2019-02-22 | End: 2019-02-22

## 2019-02-22 RX ADMIN — ONDANSETRON: 2 INJECTION INTRAMUSCULAR; INTRAVENOUS at 13:15:00

## 2019-02-22 NOTE — PROGRESS NOTES
Pt is able to sit up and ambulate without any difficulty. Procedure site remains dry and intact with no signs of bleeding and hematoma. Instructions given. Pt/Family verbalized understanding.   Pt understood instructions and will follow up with MD in 1 week

## 2019-02-22 NOTE — PROCEDURES
[facility]  Procedure Note    [facility] Patient Status:  Outpatient    1955 MRN [id number]   Location [facility] Attending Suyapa Vazquez MD   Hosp Day # 0 PCP Iván Bunch MD     Procedu

## 2019-02-22 NOTE — H&P
[facility] Patient Status:  Outpatient    1955 MRN [id number]   Location [facility] Attending Nathan Santiago MD   Hosp Day # 0 PCP Junior Robbins MD     Ad BS+x4  Extremities: Bulging varicosities along lateral right thigh and posterior calf  Pulses: 2+ bilat DP    Results:   Labs:  No results for input(s): RBC, HGB, HCT, MCV, MCH, MCHC, RDW, NEPRELIM, WBC, PLT in the last 168 hours.   No results for input(s):

## 2019-02-22 NOTE — PRE-SEDATION ASSESSMENT
[place] [facility]  IR Pre-Procedure Sedation Assessment    History of snoring or sleep or apnea?    No    History of previous problems with anesthesia or sedation  No    Physical Findings:  Neck: nl ROM  CV: RRR  PULM: normal respiratory rate/effor

## 2019-03-01 ENCOUNTER — HOSPITAL ENCOUNTER (OUTPATIENT)
Dept: ULTRASOUND IMAGING | Facility: HOSPITAL | Age: 64
Discharge: HOME | End: 2019-03-01
Attending: CLINICAL NURSE SPECIALIST
Payer: MEDICARE

## 2019-03-01 DIAGNOSIS — I87.2 VENOUS INSUFFICIENCY: ICD-10-CM

## 2019-03-01 PROCEDURE — 93971 EXTREMITY STUDY: CPT | Performed by: CLINICAL NURSE SPECIALIST

## 2019-03-07 RX ORDER — LEVOTHYROXINE SODIUM 0.12 MG/1
125 TABLET ORAL
Qty: 30 TABLET | Refills: 2 | Status: SHIPPED | OUTPATIENT
Start: 2019-03-07 | End: 2019-04-04

## 2019-03-07 NOTE — TELEPHONE ENCOUNTER
Please review; protocol failed.     Hypothyroid Medications  Protocol Criteria:  Appointment scheduled in the past 12 months or the next 3 months  TSH resulted in the past 12 months that is normal  Recent Outpatient Visits            2 months ago Encounter

## 2019-03-15 RX ORDER — ATORVASTATIN CALCIUM 20 MG/1
20 TABLET, FILM COATED ORAL NIGHTLY
Qty: 30 TABLET | Refills: 2 | Status: SHIPPED | OUTPATIENT
Start: 2019-03-15 | End: 2019-06-13

## 2019-03-22 ENCOUNTER — OFFICE VISIT (OUTPATIENT)
Dept: FAMILY MEDICINE CLINIC | Facility: CLINIC | Age: 64
End: 2019-03-22
Payer: COMMERCIAL

## 2019-03-22 ENCOUNTER — LAB ENCOUNTER (OUTPATIENT)
Dept: LAB | Age: 64
End: 2019-03-22
Attending: FAMILY MEDICINE
Payer: MEDICARE

## 2019-03-22 VITALS
TEMPERATURE: 98 F | WEIGHT: 160.19 LBS | HEIGHT: 65 IN | BODY MASS INDEX: 26.69 KG/M2 | DIASTOLIC BLOOD PRESSURE: 80 MMHG | HEART RATE: 60 BPM | SYSTOLIC BLOOD PRESSURE: 139 MMHG

## 2019-03-22 DIAGNOSIS — E03.8 OTHER SPECIFIED HYPOTHYROIDISM: ICD-10-CM

## 2019-03-22 DIAGNOSIS — E78.2 MIXED HYPERLIPIDEMIA: ICD-10-CM

## 2019-03-22 DIAGNOSIS — Z00.00 ENCOUNTER FOR ANNUAL HEALTH EXAMINATION: ICD-10-CM

## 2019-03-22 DIAGNOSIS — N18.30 CKD (CHRONIC KIDNEY DISEASE) STAGE 3, GFR 30-59 ML/MIN (HCC): Chronic | ICD-10-CM

## 2019-03-22 DIAGNOSIS — E78.2 MIXED HYPERLIPIDEMIA: Primary | ICD-10-CM

## 2019-03-22 PROBLEM — D48.5 NEOPLASM OF UNCERTAIN BEHAVIOR OF SKIN: Status: RESOLVED | Noted: 2018-10-30 | Resolved: 2019-03-22

## 2019-03-22 LAB
ALBUMIN SERPL-MCNC: 4.2 G/DL (ref 3.4–5)
ALBUMIN/GLOB SERPL: 1.1 {RATIO} (ref 1–2)
ALP LIVER SERPL-CCNC: 69 U/L (ref 50–130)
ALT SERPL-CCNC: 28 U/L (ref 13–56)
ANION GAP SERPL CALC-SCNC: 7 MMOL/L (ref 0–18)
AST SERPL-CCNC: 19 U/L (ref 15–37)
BILIRUB SERPL-MCNC: 0.7 MG/DL (ref 0.1–2)
BUN BLD-MCNC: 21 MG/DL (ref 7–18)
BUN/CREAT SERPL: 17.4 (ref 10–20)
CALCIUM BLD-MCNC: 10 MG/DL (ref 8.5–10.1)
CHLORIDE SERPL-SCNC: 108 MMOL/L (ref 98–107)
CHOLEST SMN-MCNC: 141 MG/DL (ref ?–200)
CO2 SERPL-SCNC: 28 MMOL/L (ref 21–32)
CREAT BLD-MCNC: 1.21 MG/DL (ref 0.55–1.02)
GLOBULIN PLAS-MCNC: 3.8 G/DL (ref 2.8–4.4)
GLUCOSE BLD-MCNC: 97 MG/DL (ref 70–99)
HDLC SERPL-MCNC: 41 MG/DL (ref 40–59)
LDLC SERPL CALC-MCNC: 72 MG/DL (ref ?–100)
M PROTEIN MFR SERPL ELPH: 8 G/DL (ref 6.4–8.2)
NONHDLC SERPL-MCNC: 100 MG/DL (ref ?–130)
OSMOLALITY SERPL CALC.SUM OF ELEC: 299 MOSM/KG (ref 275–295)
POTASSIUM SERPL-SCNC: 4.3 MMOL/L (ref 3.5–5.1)
SODIUM SERPL-SCNC: 143 MMOL/L (ref 136–145)
T4 FREE SERPL-MCNC: 1.5 NG/DL (ref 0.8–1.7)
TRIGL SERPL-MCNC: 141 MG/DL (ref 30–149)
TSI SER-ACNC: 1 MIU/ML (ref 0.36–3.74)
VLDLC SERPL CALC-MCNC: 28 MG/DL (ref 0–30)

## 2019-03-22 PROCEDURE — 84443 ASSAY THYROID STIM HORMONE: CPT

## 2019-03-22 PROCEDURE — 96160 PT-FOCUSED HLTH RISK ASSMT: CPT | Performed by: FAMILY MEDICINE

## 2019-03-22 PROCEDURE — 80061 LIPID PANEL: CPT

## 2019-03-22 PROCEDURE — G0438 PPPS, INITIAL VISIT: HCPCS | Performed by: FAMILY MEDICINE

## 2019-03-22 PROCEDURE — 99397 PER PM REEVAL EST PAT 65+ YR: CPT | Performed by: FAMILY MEDICINE

## 2019-03-22 PROCEDURE — 80053 COMPREHEN METABOLIC PANEL: CPT

## 2019-03-22 PROCEDURE — 84439 ASSAY OF FREE THYROXINE: CPT

## 2019-03-22 PROCEDURE — 36415 COLL VENOUS BLD VENIPUNCTURE: CPT

## 2019-03-22 NOTE — PATIENT INSTRUCTIONS
Nneka Britt's SCREENING SCHEDULE   Tests on this list are recommended by your physician but may not be covered, or covered at this frequency, by your insurer. Please check with your insurance carrier before scheduling to verify coverage.    JEREMÍAS if abnormal Colonoscopy due on 05/17/2022 Update [facility] if applicable    Flex Sigmoidoscopy Screen  Covered every 5 years No results found for this or any previous visit. No flowsheet data found.      Fecal Occult Blood   Covered Annually No res (Pneumovax)  Covered Once after 65 No orders found for this or any previous visit. Please get once after your 65th birthday    Hepatitis B for Moderate/High Risk       No orders found for this or any previous visit.  Medium/high risk factors:   End-stage re

## 2019-03-22 NOTE — PROGRESS NOTES
HPI:   Tiffani Kennedy is a 70year old female who presents for a Medicare Subsequent Annual Wellness visit (Pt already had Initial Annual Wellness). Pt here for regular check up. Feels good. Exercises and eats healthy overall. No concerns.      Dipak Smith Value Date    CHOLEST 164 08/21/2018    HDL 42 08/21/2018    LDL 97 08/21/2018    TRIG 123 08/21/2018          Last Chemistry Labs:   Lab Results   Component Value Date    AST 23 08/21/2018    ALT 19 08/21/2018    CA 10.1 08/21/2018    ALB 4.2 08/21/2018 otherwise  SKIN: denies any unusual skin lesions  EYES: denies blurred vision or double vision  HEENT: denies nasal congestion, sinus pain or ST  LUNGS: denies shortness of breath with exertion  CARDIOVASCULAR: denies chest pain on exertion  GI: denies abd get annoyed because I misunderstand what they say:  No   I misunderstand what others are saying and make inappropriate responses:  No I avoid social activities because I cannot hear well and fear I will reply improperly:  No   Family members and friends ha (Prevnar 13) 08/21/2018        ASSESSMENT AND OTHER RELEVANT CHRONIC CONDITIONS:   Johana Swenson is a 70year old female who presents for a Medicare Assessment. PLAN SUMMARY:   1. Mixed hyperlipidemia  Due for labs.  Has been stable  - LIPID PANEL; F due on 05/17/2022 Update Health Maintenance if applicable    Flex Sigmoidoscopy Screen every 10 years No results found for this or any previous visit. No flowsheet data found.      Fecal Occult Blood Annually No results found for: FOB No flowsheet data foun B No vaccine history found This may be covered with your pharmacy  prescription benefits                    Template: VILMA COWART MEDICARE ANNUAL ASSESSMENT FEMALE [82591]

## 2019-03-27 NOTE — PROGRESS NOTES
Please call pt with these results. I can speak with her if she prefers  Adelaide Weir - Your cholesterol and thyroid are stable. There is slight worsening of your kidney function.  Since your blood pressure was also slightly elevated, I would like you to start a

## 2019-04-04 RX ORDER — LEVOTHYROXINE SODIUM 0.12 MG/1
125 TABLET ORAL
Qty: 90 TABLET | Refills: 0 | Status: SHIPPED | OUTPATIENT
Start: 2019-04-04 | End: 2019-07-04

## 2019-04-05 NOTE — TELEPHONE ENCOUNTER
Requested Prescriptions   Pending Prescriptions Disp Refills   • LEVOTHYROXINE SODIUM 125 MCG Oral Tab [Pharmacy Med Name: LEVOTHYROXINE 125 MCG TABLET] 30 tablet 0     Sig: TAKE 1 TABLET (125 MCG TOTAL) BY MOUTH BEFORE BREAKFAST.     Thyroid Medication Pro

## 2019-04-09 ENCOUNTER — OFFICE VISIT (OUTPATIENT)
Dept: DERMATOLOGY CLINIC | Facility: CLINIC | Age: 64
End: 2019-04-09
Payer: COMMERCIAL

## 2019-04-09 DIAGNOSIS — Z85.828 PERSONAL HISTORY OF SKIN CANCER: ICD-10-CM

## 2019-04-09 DIAGNOSIS — D23.30 BENIGN NEOPLASM OF SKIN OF FACE: ICD-10-CM

## 2019-04-09 DIAGNOSIS — D23.70 BENIGN NEOPLASM OF SKIN OF LOWER EXTREMITY, INCLUDING HIP, UNSPECIFIED LATERALITY: ICD-10-CM

## 2019-04-09 DIAGNOSIS — L81.4 SOLAR LENTIGO: ICD-10-CM

## 2019-04-09 DIAGNOSIS — L57.8 SUN-DAMAGED SKIN: ICD-10-CM

## 2019-04-09 DIAGNOSIS — D23.60 BENIGN NEOPLASM OF SKIN OF UPPER EXTREMITY AND SHOULDER, UNSPECIFIED LATERALITY: ICD-10-CM

## 2019-04-09 DIAGNOSIS — D23.5 BENIGN NEOPLASM OF SKIN OF TRUNK, EXCEPT SCROTUM: ICD-10-CM

## 2019-04-09 DIAGNOSIS — L82.1 SEBORRHEIC KERATOSES: ICD-10-CM

## 2019-04-09 DIAGNOSIS — D23.4 BENIGN NEOPLASM OF SCALP AND SKIN OF NECK: ICD-10-CM

## 2019-04-09 DIAGNOSIS — D48.5 NEOPLASM OF UNCERTAIN BEHAVIOR OF SKIN: Primary | ICD-10-CM

## 2019-04-09 PROCEDURE — 11102 TANGNTL BX SKIN SINGLE LES: CPT | Performed by: DERMATOLOGY

## 2019-04-09 PROCEDURE — 88305 TISSUE EXAM BY PATHOLOGIST: CPT | Performed by: DERMATOLOGY

## 2019-04-09 PROCEDURE — 99213 OFFICE O/P EST LOW 20 MIN: CPT | Performed by: DERMATOLOGY

## 2019-04-09 NOTE — PROGRESS NOTES
HPI:     Chief Complaint     Full Skin Exam        HPI     Full Skin Exam      Additional comments: LOV 10/30/18. pt presenting today for full body skin check.  Pt has personal HX of [address]          Last edited by Jana Millard MA on 4/9/2019 11:35 AM. (Hi Esters 1 g Oral Cap Take 1 g by mouth 2 (two) times daily. Disp:  Rfl:    aspirin 81 MG Oral Tab Take 81 mg by mouth daily.  Disp:  Rfl:      Allergies:   No Known Allergies    Past Medical History:   Diagnosis Date   • BCC (basal cell carcinoma of skin) 20 Relationship status: Not on file      Intimate partner violence:        Fear of current or ex partner: Not on file        Emotionally abused: Not on file        Physically abused: Not on file        Forced sexual activity: Not on file    Other Topics she can remember.   Also states papular lesion on nose has been there for as long as she can remember without change  -there are scattered blotchy brown macules on face, trunk and extremities  - there are some cherry red papules  - there are numerous Frankie Puentes

## 2019-04-17 DIAGNOSIS — I87.2 VENOUS INSUFFICIENCY: Primary | ICD-10-CM

## 2019-05-02 ENCOUNTER — LAB ENCOUNTER (OUTPATIENT)
Dept: LAB | Age: 64
End: 2019-05-02
Attending: FAMILY MEDICINE
Payer: MEDICARE

## 2019-05-02 DIAGNOSIS — R94.4 DECREASED GFR: ICD-10-CM

## 2019-05-02 PROCEDURE — 82043 UR ALBUMIN QUANTITATIVE: CPT

## 2019-05-02 PROCEDURE — 82570 ASSAY OF URINE CREATININE: CPT

## 2019-05-02 PROCEDURE — 36415 COLL VENOUS BLD VENIPUNCTURE: CPT

## 2019-05-02 PROCEDURE — 80048 BASIC METABOLIC PNL TOTAL CA: CPT

## 2019-05-03 ENCOUNTER — HOSPITAL ENCOUNTER (OUTPATIENT)
Dept: INTERVENTIONAL RADIOLOGY/VASCULAR | Facility: HOSPITAL | Age: 64
Discharge: HOME | End: 2019-05-03
Attending: RADIOLOGY | Admitting: RADIOLOGY
Payer: MEDICARE

## 2019-05-03 VITALS
OXYGEN SATURATION: 93 % | WEIGHT: 149 LBS | DIASTOLIC BLOOD PRESSURE: 90 MMHG | BODY MASS INDEX: 25 KG/M2 | RESPIRATION RATE: 20 BRPM | SYSTOLIC BLOOD PRESSURE: 142 MMHG | HEART RATE: 79 BPM

## 2019-05-03 DIAGNOSIS — I87.2 VENOUS INSUFFICIENCY: ICD-10-CM

## 2019-05-03 PROCEDURE — 76942 ECHO GUIDE FOR BIOPSY: CPT

## 2019-05-03 PROCEDURE — 06DY3ZZ EXTRACTION OF LOWER VEIN, PERCUTANEOUS APPROACH: ICD-10-PCS | Performed by: RADIOLOGY

## 2019-05-03 PROCEDURE — 99153 MOD SED SAME PHYS/QHP EA: CPT

## 2019-05-03 PROCEDURE — 3E033TZ INTRODUCTION OF DESTRUCTIVE AGENT INTO PERIPHERAL VEIN, PERCUTANEOUS APPROACH: ICD-10-PCS | Performed by: RADIOLOGY

## 2019-05-03 PROCEDURE — 36415 COLL VENOUS BLD VENIPUNCTURE: CPT

## 2019-05-03 PROCEDURE — 99152 MOD SED SAME PHYS/QHP 5/>YRS: CPT

## 2019-05-03 PROCEDURE — 37765 STAB PHLEB VEINS XTR 10-20: CPT

## 2019-05-03 PROCEDURE — 36471 NJX SCLRSNT MLT INCMPTNT VN: CPT

## 2019-05-03 RX ORDER — ONDANSETRON 4 MG/1
TABLET, FILM COATED ORAL
Status: DISCONTINUED
Start: 2019-05-03 | End: 2019-05-03

## 2019-05-03 RX ORDER — ONDANSETRON 4 MG/1
TABLET, FILM COATED ORAL
Status: COMPLETED
Start: 2019-05-03 | End: 2019-05-03

## 2019-05-03 RX ORDER — MIDAZOLAM HYDROCHLORIDE 1 MG/ML
INJECTION INTRAMUSCULAR; INTRAVENOUS
Status: COMPLETED
Start: 2019-05-03 | End: 2019-05-03

## 2019-05-03 RX ORDER — SODIUM CHLORIDE 9 MG/ML
INJECTION, SOLUTION INTRAVENOUS
Status: COMPLETED
Start: 2019-05-03 | End: 2019-05-03

## 2019-05-03 RX ORDER — SODIUM TETRADECYL SULFATE 30 MG/ML
INJECTION, SOLUTION INTRAVENOUS
Status: COMPLETED
Start: 2019-05-03 | End: 2019-05-03

## 2019-05-03 RX ORDER — LIDOCAINE HYDROCHLORIDE 20 MG/ML
INJECTION, SOLUTION EPIDURAL; INFILTRATION; INTRACAUDAL; PERINEURAL
Status: COMPLETED
Start: 2019-05-03 | End: 2019-05-03

## 2019-05-03 RX ORDER — SODIUM CHLORIDE 9 MG/ML
INJECTION, SOLUTION INTRAVENOUS
Status: DISCONTINUED
Start: 2019-05-03 | End: 2019-05-03

## 2019-05-03 RX ORDER — SODIUM CHLORIDE 9 MG/ML
INJECTION, SOLUTION INTRAVENOUS CONTINUOUS
Status: DISCONTINUED | OUTPATIENT
Start: 2019-05-03 | End: 2019-05-03

## 2019-05-03 RX ORDER — ONDANSETRON 2 MG/ML
8 INJECTION INTRAMUSCULAR; INTRAVENOUS EVERY 4 HOURS PRN
Status: DISCONTINUED | OUTPATIENT
Start: 2019-05-03 | End: 2019-05-03

## 2019-05-03 RX ADMIN — ONDANSETRON 8 MG: 4 TABLET, FILM COATED ORAL at 10:38:00

## 2019-05-03 RX ADMIN — ONDANSETRON 8 MG: 4 TABLET, FILM COATED ORAL at 15:10:00

## 2019-05-03 NOTE — PRE-SEDATION ASSESSMENT
[place] [facility]  IR Pre-Procedure Sedation Assessment    History of snoring or sleep or apnea?    No    History of previous problems with anesthesia or sedation  No    Physical Findings:  Neck: nl ROM  CV: RRR  PULM: normal respiratory rate/effor

## 2019-05-03 NOTE — H&P
[facility]    1955 MRN [id number]   Location [facility] Attending Leslee Arredondo MD   Hosp Day # 0 PCP Enrrique Rodríguez, BS+x4  Extremities: bulging varicose veins of left calf; scattered spider veins bilaterally  Pulses: 2+ bilat DP    Results:   Labs:  No results for input(s): RBC, HGB, HCT, MCV, MCH, MCHC, RDW, NEPRELIM, WBC, PLT in the last 168 hours.   No results for inp

## 2019-05-03 NOTE — PROCEDURES
[facility]  Procedure Note    [facility] Patient Status:  Outpatient in a Bed    1955 MRN [id number]   Location [facility] Attending Maritza Wall MD   Hosp Day # 0 PCP Jennifer Mckeon MD

## 2019-05-03 NOTE — PROGRESS NOTES
Patient discharged home with discharge instructions. Instructed to take metoclopramide at home. No more ondansetron today. Patient is still nauseated upon discharge but wants to go home.

## 2019-05-06 DIAGNOSIS — R94.4 DECREASED GFR: Primary | ICD-10-CM

## 2019-05-06 NOTE — PROGRESS NOTES
[address] kidney function is still mildly decreased but stable. Please continue to avoid NSAIDs - ibuprofen, aleve, advil products and stay hydrated. Plan to repeat labs in 6 months.  I placed the order. - Dr. Karo Davila

## 2019-05-10 ENCOUNTER — APPOINTMENT (OUTPATIENT)
Dept: INTERVENTIONAL RADIOLOGY/VASCULAR | Facility: HOSPITAL | Age: 64
End: 2019-05-10
Attending: RADIOLOGY
Payer: MEDICARE

## 2019-06-13 RX ORDER — ATORVASTATIN CALCIUM 20 MG/1
TABLET, FILM COATED ORAL
Qty: 90 TABLET | Refills: 1 | Status: SHIPPED | OUTPATIENT
Start: 2019-06-13 | End: 2019-12-22

## 2019-06-14 NOTE — TELEPHONE ENCOUNTER
Refill passed per [facility], [facility] protocol.     Requested Prescriptions   Pending Prescriptions Disp Refills   • ATORVASTATIN 20 MG Oral Tab [Pharmacy Med Name: ATORVASTATIN 20 MG TABLET] 30 tablet 2     Sig: TAKE 1 TABLET BY MOUTH EVERY DAY AT NIGHT

## 2019-07-04 RX ORDER — LEVOTHYROXINE SODIUM 0.12 MG/1
125 TABLET ORAL
Qty: 90 TABLET | Refills: 1 | Status: SHIPPED | OUTPATIENT
Start: 2019-07-04 | End: 2020-01-30

## 2019-07-04 NOTE — TELEPHONE ENCOUNTER
Refill passed per [facility] protocol.   Hypothyroid Medications  Protocol Criteria:  Appointment scheduled in the past 12 months or the next 3 months  TSH resulted in the past 12 months that is normal  Recent Outpatient Visits            2 months ago

## 2019-09-10 ENCOUNTER — PATIENT MESSAGE (OUTPATIENT)
Dept: FAMILY MEDICINE CLINIC | Facility: CLINIC | Age: 64
End: 2019-09-10

## 2019-09-10 ENCOUNTER — TELEPHONE (OUTPATIENT)
Dept: FAMILY MEDICINE CLINIC | Facility: CLINIC | Age: 64
End: 2019-09-10

## 2019-09-10 DIAGNOSIS — Z12.31 VISIT FOR SCREENING MAMMOGRAM: Primary | ICD-10-CM

## 2019-09-11 NOTE — TELEPHONE ENCOUNTER
From: Valeri Jimenez  To: Jack Scott MD  Sent: 9/10/2019 7:33 PM CDT  Subject: Non-Urgent Medical Question    Dr Marino Andersen, my chart shows I’m due for pneumonia shot. I thought the one I got last year at age 70 was lifetime?  Maryam Rayo

## 2019-09-12 ENCOUNTER — PATIENT MESSAGE (OUTPATIENT)
Dept: FAMILY MEDICINE CLINIC | Facility: CLINIC | Age: 64
End: 2019-09-12

## 2019-09-12 NOTE — TELEPHONE ENCOUNTER
Advised patient of Dr. Enoc Tejada note and number given to scheduling. Patient verbalized understanding.

## 2019-09-13 ENCOUNTER — NURSE ONLY (OUTPATIENT)
Dept: FAMILY MEDICINE CLINIC | Facility: CLINIC | Age: 64
End: 2019-09-13
Payer: COMMERCIAL

## 2019-09-13 DIAGNOSIS — Z23 NEED FOR PNEUMOCOCCAL VACCINATION: ICD-10-CM

## 2019-09-13 DIAGNOSIS — Z23 NEED FOR INFLUENZA VACCINATION: Primary | ICD-10-CM

## 2019-09-13 PROCEDURE — 90732 PPSV23 VACC 2 YRS+ SUBQ/IM: CPT | Performed by: FAMILY MEDICINE

## 2019-09-13 PROCEDURE — G0009 ADMIN PNEUMOCOCCAL VACCINE: HCPCS | Performed by: FAMILY MEDICINE

## 2019-09-13 PROCEDURE — 90653 IIV ADJUVANT VACCINE IM: CPT | Performed by: FAMILY MEDICINE

## 2019-09-13 PROCEDURE — G0008 ADMIN INFLUENZA VIRUS VAC: HCPCS | Performed by: FAMILY MEDICINE

## 2019-09-13 NOTE — PROGRESS NOTES
Patient is here for her Influenza and Pneumococcal 23 vaccine. Patient tolerated vaccines, no adverse reaction noted.

## 2019-09-30 ENCOUNTER — OFFICE VISIT (OUTPATIENT)
Dept: FAMILY MEDICINE CLINIC | Facility: CLINIC | Age: 64
End: 2019-09-30
Payer: COMMERCIAL

## 2019-09-30 VITALS
TEMPERATURE: 98 F | BODY MASS INDEX: 25.83 KG/M2 | HEART RATE: 83 BPM | WEIGHT: 155 LBS | HEIGHT: 65 IN | SYSTOLIC BLOOD PRESSURE: 115 MMHG | DIASTOLIC BLOOD PRESSURE: 65 MMHG

## 2019-09-30 DIAGNOSIS — J34.89 SINUS PRESSURE: Primary | ICD-10-CM

## 2019-09-30 PROCEDURE — 99213 OFFICE O/P EST LOW 20 MIN: CPT | Performed by: FAMILY MEDICINE

## 2019-09-30 PROCEDURE — G0463 HOSPITAL OUTPT CLINIC VISIT: HCPCS | Performed by: FAMILY MEDICINE

## 2019-09-30 RX ORDER — AMOXICILLIN AND CLAVULANATE POTASSIUM 875; 125 MG/1; MG/1
1 TABLET, FILM COATED ORAL 2 TIMES DAILY
Qty: 20 TABLET | Refills: 0 | Status: SHIPPED | OUTPATIENT
Start: 2019-09-30 | End: 2019-10-10

## 2019-09-30 NOTE — PROGRESS NOTES
HPI:   Donato Santiago is a 75year old female who presents for upper respiratory symptoms for  4  days. Patient reports dry cough. No fevers/chills. Mild sore throat. Feels pressure in her face - pain. Slight ear pressure.        Current Outpatient [facility]    laparascopy   • TONSILLECTOMY  1959      Family History   Problem Relation Age of Onset   • Diabetes Father    • Cancer Father    • Heart Disorder Father    • Cancer Mother    • Heart Disorder Mother       Social History    Tobacco Use      Smoking

## 2019-10-01 ENCOUNTER — HOSPITAL ENCOUNTER (OUTPATIENT)
Dept: MAMMOGRAPHY | Age: 64
Discharge: HOME | End: 2019-10-01
Attending: FAMILY MEDICINE
Payer: MEDICARE

## 2019-10-01 DIAGNOSIS — Z12.31 VISIT FOR SCREENING MAMMOGRAM: ICD-10-CM

## 2019-10-01 PROCEDURE — 77067 SCR MAMMO BI INCL CAD: CPT | Performed by: FAMILY MEDICINE

## 2019-10-01 PROCEDURE — 77063 BREAST TOMOSYNTHESIS BI: CPT | Performed by: FAMILY MEDICINE

## 2019-10-04 ENCOUNTER — HOSPITAL ENCOUNTER (OUTPATIENT)
Dept: ULTRASOUND IMAGING | Facility: HOSPITAL | Age: 64
Discharge: HOME | End: 2019-10-04
Attending: FAMILY MEDICINE
Payer: MEDICARE

## 2019-10-04 ENCOUNTER — HOSPITAL ENCOUNTER (OUTPATIENT)
Dept: MAMMOGRAPHY | Facility: HOSPITAL | Age: 64
Discharge: HOME | End: 2019-10-04
Attending: FAMILY MEDICINE
Payer: MEDICARE

## 2019-10-04 DIAGNOSIS — R92.8 ABNORMAL MAMMOGRAM: ICD-10-CM

## 2019-10-04 PROCEDURE — 76642 ULTRASOUND BREAST LIMITED: CPT | Performed by: FAMILY MEDICINE

## 2019-10-04 PROCEDURE — 77065 DX MAMMO INCL CAD UNI: CPT | Performed by: FAMILY MEDICINE

## 2019-10-04 PROCEDURE — 77061 BREAST TOMOSYNTHESIS UNI: CPT | Performed by: FAMILY MEDICINE

## 2019-10-08 DIAGNOSIS — R92.8 ABNORMAL MAMMOGRAM: Primary | ICD-10-CM

## 2019-10-08 NOTE — PROGRESS NOTES
Stefany Jaquez - As you know, the radiologist is recommending repeat ultrasound of the left breast in 6 months.  I have placed the order. - Dr. Nery Sheridan

## 2019-10-10 ENCOUNTER — OFFICE VISIT (OUTPATIENT)
Dept: DERMATOLOGY CLINIC | Facility: CLINIC | Age: 64
End: 2019-10-10
Payer: COMMERCIAL

## 2019-10-10 DIAGNOSIS — D23.5 BENIGN NEOPLASM OF SKIN OF TRUNK, EXCEPT SCROTUM: ICD-10-CM

## 2019-10-10 DIAGNOSIS — D23.4 BENIGN NEOPLASM OF SCALP AND SKIN OF NECK: ICD-10-CM

## 2019-10-10 DIAGNOSIS — L57.8 SUN-DAMAGED SKIN: ICD-10-CM

## 2019-10-10 DIAGNOSIS — D23.30 BENIGN NEOPLASM OF SKIN OF FACE: ICD-10-CM

## 2019-10-10 DIAGNOSIS — D23.60 BENIGN NEOPLASM OF SKIN OF UPPER EXTREMITY AND SHOULDER, UNSPECIFIED LATERALITY: ICD-10-CM

## 2019-10-10 DIAGNOSIS — Z85.828 PERSONAL HISTORY OF SKIN CANCER: Primary | ICD-10-CM

## 2019-10-10 DIAGNOSIS — L82.1 SEBORRHEIC KERATOSES: ICD-10-CM

## 2019-10-10 DIAGNOSIS — L81.4 SOLAR LENTIGO: ICD-10-CM

## 2019-10-10 PROCEDURE — 99213 OFFICE O/P EST LOW 20 MIN: CPT | Performed by: DERMATOLOGY

## 2019-10-10 PROCEDURE — G0463 HOSPITAL OUTPT CLINIC VISIT: HCPCS | Performed by: DERMATOLOGY

## 2019-10-10 NOTE — PROGRESS NOTES
HPI:     Chief Complaint     Derm Problem        HPI     Derm Problem      Additional comments: LOV 4/9/2019 - Pt presenting for upper body skin exam.  Pt states personal hx of BCC. Pt denies family hx of skin cancer.            Last edited by Renuka Calderon total) by mouth 3 (three) times daily before meals. Disp: 3 tablet Rfl: 1   ondansetron 8 MG Oral Tablet Dispersible Take 1 tablet (8 mg total) by mouth every 4 (four) hours as needed for Nausea.  Disp: 12 tablet Rfl: 1     Allergies:   No Known Allergies Not on file        Attends meetings of clubs or organizations: Not on file        Relationship status: Not on file      Intimate partner violence:        Fear of current or ex partner: Not on file        Emotionally abused: Not on file        Physically ab nothing suspicious at that juncture may go to once yearly exams. Seborrheic keratoses-reassurance–no treatment  Sun-damaged skin-proper use and application of broad-spectrum sunblock SPF 30 or higher discussed.   Patient understands to put it on  15-20 min

## 2019-12-16 RX ORDER — LISINOPRIL 5 MG/1
TABLET ORAL
Qty: 90 TABLET | Refills: 2 | Status: SHIPPED | OUTPATIENT
Start: 2019-12-16 | End: 2020-10-09

## 2019-12-16 NOTE — TELEPHONE ENCOUNTER
Please review; protocol failed.     Requested Prescriptions     Pending Prescriptions Disp Refills   • LISINOPRIL 5 MG Oral Tab [Pharmacy Med Name: LISINOPRIL 5 MG TABLET] 90 tablet 2     Sig: TAKE 1 TABLET BY MOUTH EVERY DAY       Recent Visits  Date Type

## 2019-12-22 RX ORDER — ATORVASTATIN CALCIUM 20 MG/1
TABLET, FILM COATED ORAL
Qty: 90 TABLET | Refills: 1 | Status: SHIPPED | OUTPATIENT
Start: 2019-12-22 | End: 2020-07-15

## 2019-12-22 NOTE — TELEPHONE ENCOUNTER
Refill passed per [facility], [facility] protocol.   Cholesterol Medications  Protocol Criteria:  · Appointment scheduled in the past 12 months or in the next 3 months  · ALT & LDL on file in the past 12 months  · ALT result < 80  · LDL result <130   Recent Outpat

## 2020-01-07 ENCOUNTER — OFFICE VISIT (OUTPATIENT)
Dept: FAMILY MEDICINE CLINIC | Facility: CLINIC | Age: 65
End: 2020-01-07
Payer: COMMERCIAL

## 2020-01-07 ENCOUNTER — LAB ENCOUNTER (OUTPATIENT)
Dept: LAB | Age: 65
End: 2020-01-07
Attending: FAMILY MEDICINE
Payer: MEDICARE

## 2020-01-07 VITALS
SYSTOLIC BLOOD PRESSURE: 126 MMHG | TEMPERATURE: 97 F | WEIGHT: 164 LBS | BODY MASS INDEX: 27 KG/M2 | DIASTOLIC BLOOD PRESSURE: 69 MMHG | HEIGHT: 65.5 IN | HEART RATE: 72 BPM

## 2020-01-07 DIAGNOSIS — N18.30 CKD (CHRONIC KIDNEY DISEASE) STAGE 3, GFR 30-59 ML/MIN (HCC): ICD-10-CM

## 2020-01-07 DIAGNOSIS — E03.8 OTHER SPECIFIED HYPOTHYROIDISM: ICD-10-CM

## 2020-01-07 DIAGNOSIS — E78.2 MIXED HYPERLIPIDEMIA: Primary | ICD-10-CM

## 2020-01-07 DIAGNOSIS — R94.4 DECREASED GFR: ICD-10-CM

## 2020-01-07 LAB
ANION GAP SERPL CALC-SCNC: 4 MMOL/L (ref 0–18)
BUN BLD-MCNC: 26 MG/DL (ref 7–18)
BUN/CREAT SERPL: 20.6 (ref 10–20)
CALCIUM BLD-MCNC: 9.5 MG/DL (ref 8.5–10.1)
CHLORIDE SERPL-SCNC: 108 MMOL/L (ref 98–112)
CO2 SERPL-SCNC: 31 MMOL/L (ref 21–32)
CREAT BLD-MCNC: 1.26 MG/DL (ref 0.55–1.02)
GLUCOSE BLD-MCNC: 93 MG/DL (ref 70–99)
OSMOLALITY SERPL CALC.SUM OF ELEC: 300 MOSM/KG (ref 275–295)
PATIENT FASTING Y/N/NP: NO
POTASSIUM SERPL-SCNC: 4.3 MMOL/L (ref 3.5–5.1)
SODIUM SERPL-SCNC: 143 MMOL/L (ref 136–145)
T4 FREE SERPL-MCNC: 1.3 NG/DL (ref 0.8–1.7)
TSI SER-ACNC: 0.63 MIU/ML (ref 0.36–3.74)

## 2020-01-07 PROCEDURE — 84443 ASSAY THYROID STIM HORMONE: CPT | Performed by: FAMILY MEDICINE

## 2020-01-07 PROCEDURE — 80048 BASIC METABOLIC PNL TOTAL CA: CPT

## 2020-01-07 PROCEDURE — G0463 HOSPITAL OUTPT CLINIC VISIT: HCPCS | Performed by: FAMILY MEDICINE

## 2020-01-07 PROCEDURE — 36415 COLL VENOUS BLD VENIPUNCTURE: CPT

## 2020-01-07 PROCEDURE — 84439 ASSAY OF FREE THYROXINE: CPT | Performed by: FAMILY MEDICINE

## 2020-01-07 PROCEDURE — 99213 OFFICE O/P EST LOW 20 MIN: CPT | Performed by: FAMILY MEDICINE

## 2020-01-07 NOTE — PROGRESS NOTES
Chelsey Kearns is a 75year old female. Patient presents with:  Hyperlipidemia: check up  Hypertension    HPI:   Pt here for regular follow up. Feeling good overall. Has been staying active. No BP no SOB.    ATORVASTATIN 20 MG Oral Tab, TAKE 1 TABLET BY denies heartburn  NEURO: denies headaches  Musculoskeletal: no joint pain, back pain    EXAM:   /69 (BP Location: Right arm, Patient Position: Sitting, Cuff Size: large)   Pulse 72   Temp 97.3 °F (36.3 °C) (Oral)   Ht 5' 5.5\" (1.664 m)   Wt 164 lb (

## 2020-01-12 NOTE — PROGRESS NOTES
[address] kidney function is stable. It is still a bit decreased so continue to avoid any ibuprofen, advil, aleve and stay well hydrated.  Continue current medications the same and repeat labs in 6 months. - Dr. Mendez Dub

## 2020-01-23 ENCOUNTER — TELEPHONE (OUTPATIENT)
Dept: CASE MANAGEMENT | Age: 65
End: 2020-01-23

## 2020-01-23 NOTE — TELEPHONE ENCOUNTER
Patient is eligible for a 2020 Medicare Advantage Supervisit. Left message to call back [phone].

## 2020-01-30 RX ORDER — LEVOTHYROXINE SODIUM 0.12 MG/1
125 TABLET ORAL
Qty: 90 TABLET | Refills: 1 | Status: SHIPPED | OUTPATIENT
Start: 2020-01-30 | End: 2020-08-12

## 2020-03-02 ENCOUNTER — OFFICE VISIT (OUTPATIENT)
Dept: FAMILY MEDICINE CLINIC | Facility: CLINIC | Age: 65
End: 2020-03-02
Payer: COMMERCIAL

## 2020-03-02 VITALS
BODY MASS INDEX: 26.17 KG/M2 | HEART RATE: 76 BPM | SYSTOLIC BLOOD PRESSURE: 118 MMHG | WEIGHT: 159 LBS | DIASTOLIC BLOOD PRESSURE: 71 MMHG | HEIGHT: 65.5 IN

## 2020-03-02 DIAGNOSIS — N18.30 CKD (CHRONIC KIDNEY DISEASE) STAGE 3, GFR 30-59 ML/MIN (HCC): Chronic | ICD-10-CM

## 2020-03-02 DIAGNOSIS — M85.80 OSTEOPENIA, UNSPECIFIED LOCATION: ICD-10-CM

## 2020-03-02 DIAGNOSIS — E03.8 OTHER SPECIFIED HYPOTHYROIDISM: ICD-10-CM

## 2020-03-02 DIAGNOSIS — Z00.00 ENCOUNTER FOR ANNUAL HEALTH EXAMINATION: ICD-10-CM

## 2020-03-02 DIAGNOSIS — Z85.828 PERSONAL HISTORY OF SKIN CANCER: ICD-10-CM

## 2020-03-02 DIAGNOSIS — E78.2 MIXED HYPERLIPIDEMIA: Primary | ICD-10-CM

## 2020-03-02 DIAGNOSIS — Z78.0 POSTMENOPAUSAL: ICD-10-CM

## 2020-03-02 PROBLEM — L57.8 SUN-DAMAGED SKIN: Status: RESOLVED | Noted: 2019-04-09 | Resolved: 2020-03-02

## 2020-03-02 PROBLEM — D48.5 NEOPLASM OF UNCERTAIN BEHAVIOR OF SKIN: Status: RESOLVED | Noted: 2018-10-30 | Resolved: 2020-03-02

## 2020-03-02 PROBLEM — L82.1 SEBORRHEIC KERATOSES: Status: RESOLVED | Noted: 2019-04-09 | Resolved: 2020-03-02

## 2020-03-02 PROCEDURE — G0439 PPPS, SUBSEQ VISIT: HCPCS | Performed by: FAMILY MEDICINE

## 2020-03-02 PROCEDURE — 99397 PER PM REEVAL EST PAT 65+ YR: CPT | Performed by: FAMILY MEDICINE

## 2020-03-02 PROCEDURE — 96160 PT-FOCUSED HLTH RISK ASSMT: CPT | Performed by: FAMILY MEDICINE

## 2020-03-02 NOTE — PROGRESS NOTES
HPI:   Franki Barnes is a 75year old female who presents for a Medicare Subsequent Annual Wellness visit (Pt already had Initial Annual Wellness). Here for regular check up. Received her flu shot. Staying active. Eating healthy.    Annual Physical Component Value Date    AST 19 03/22/2019    ALT 28 03/22/2019    CA 9.5 01/07/2020    ALB 4.2 03/22/2019    TSH 0.633 01/07/2020    CREATSERUM 1.26 (H) 01/07/2020    GLU 93 01/07/2020        CBC  (most recent labs)   Lab Results   Component Value Date denies abdominal pain, denies heartburn  : denies dysuria, vaginal discharge or itching, no complaint of urinary incontinence   MUSCULOSKELETAL: denies back pain  NEURO: denies headaches  PSYCHE: denies depression or anxiety  HEMATOLOGIC: denies hx of an Uncorrected Right Eye Chart Acuity: 20/50   Left Eye Visual Acuity: Uncorrected Left Eye Chart Acuity: 20/50   Both Eyes Visual Acuity: Uncorrected Both Eyes Chart Acuity: 20/50   Able To Tolerate Visual Acuity: Yes      General Appearance:  Alert, coopera female who presents for a Medicare Assessment. PLAN SUMMARY:   1. Mixed hyperlipidemia  Order for labs. stable  - LIPID PANEL; Future    2. Other specified hypothyroidism  Stable. Order for labs in few months. Continue current medications the same.    - Blood Sugar (FSB)Annually Glucose (mg/dL)   Date Value   01/07/2020 93          Cardiovascular Disease Screening     LDL Annually LDL Cholesterol (mg/dL)   Date Value   03/22/2019 72        EKG - w/ Initial Preventative Physical Exam only, or if medically who live in the same house as a HepB virus carrier   Homosexual men   Illicit injectable drug abusers     Tetanus Toxoid  Only covered with a cut with metal- TD and TDaP Not covered by Medicare Part B No vaccine history found This may be covered with your

## 2020-03-02 NOTE — PATIENT INSTRUCTIONS
Nneka Britt's SCREENING SCHEDULE   Tests on this list are recommended by your physician but may not be covered, or covered at this frequency, by your insurer. Please check with your insurance carrier before scheduling to verify coverage.    JEREMÍAS if abnormal Colonoscopy due on 05/17/2022 Update [facility] if applicable    Flex Sigmoidoscopy Screen  Covered every 5 years No results found for this or any previous visit. No flowsheet data found.      Fecal Occult Blood   Covered Annually No res (Pneumovax)  Covered Once after 65 Orders placed or performed in visit on 09/13/19   • PNEUMOCOCCAL IMM (PNEUMOVAX)    Please get once after your 65th birthday    Hepatitis B for Moderate/High Risk       No orders found for this or any previous visit.  Medi

## 2020-06-01 ENCOUNTER — HOSPITAL ENCOUNTER (OUTPATIENT)
Dept: ULTRASOUND IMAGING | Facility: HOSPITAL | Age: 65
Discharge: HOME | End: 2020-06-01
Attending: FAMILY MEDICINE
Payer: MEDICARE

## 2020-06-01 DIAGNOSIS — R92.8 ABNORMAL MAMMOGRAM: ICD-10-CM

## 2020-06-01 PROCEDURE — 76642 ULTRASOUND BREAST LIMITED: CPT | Performed by: FAMILY MEDICINE

## 2020-06-06 DIAGNOSIS — R92.8 ABNORMAL MAMMOGRAM: Primary | ICD-10-CM

## 2020-06-06 NOTE — PROGRESS NOTES
Harika Hernández - The radiologist wants to continue to monitor the breast cysts so he does recommend repeating mammogram in 6 months.  I placed the order. - Dr. Garza Drought

## 2020-07-15 RX ORDER — ATORVASTATIN CALCIUM 20 MG/1
TABLET, FILM COATED ORAL
Qty: 90 TABLET | Refills: 1 | Status: SHIPPED | OUTPATIENT
Start: 2020-07-15 | End: 2021-01-11

## 2020-08-12 RX ORDER — LEVOTHYROXINE SODIUM 0.12 MG/1
125 TABLET ORAL
Qty: 90 TABLET | Refills: 1 | Status: SHIPPED | OUTPATIENT
Start: 2020-08-12 | End: 2021-02-01

## 2020-08-21 ENCOUNTER — LAB ENCOUNTER (OUTPATIENT)
Dept: LAB | Age: 65
End: 2020-08-21
Attending: FAMILY MEDICINE
Payer: MEDICARE

## 2020-08-21 DIAGNOSIS — E78.2 MIXED HYPERLIPIDEMIA: ICD-10-CM

## 2020-08-21 DIAGNOSIS — N18.30 CKD (CHRONIC KIDNEY DISEASE) STAGE 3, GFR 30-59 ML/MIN (HCC): ICD-10-CM

## 2020-08-21 DIAGNOSIS — M85.80 OSTEOPENIA, UNSPECIFIED LOCATION: ICD-10-CM

## 2020-08-21 DIAGNOSIS — E03.8 OTHER SPECIFIED HYPOTHYROIDISM: ICD-10-CM

## 2020-08-21 LAB
ALBUMIN SERPL-MCNC: 3.9 G/DL (ref 3.4–5)
ALBUMIN/GLOB SERPL: 1.1 {RATIO} (ref 1–2)
ALP LIVER SERPL-CCNC: 70 U/L (ref 55–142)
ALT SERPL-CCNC: 24 U/L (ref 13–56)
ANION GAP SERPL CALC-SCNC: 5 MMOL/L (ref 0–18)
AST SERPL-CCNC: 17 U/L (ref 15–37)
BASOPHILS # BLD AUTO: 0.07 X10(3) UL (ref 0–0.2)
BASOPHILS NFR BLD AUTO: 1.1 %
BILIRUB SERPL-MCNC: 0.8 MG/DL (ref 0.1–2)
BUN BLD-MCNC: 27 MG/DL (ref 7–18)
BUN/CREAT SERPL: 22 (ref 10–20)
CALCIUM BLD-MCNC: 9.9 MG/DL (ref 8.5–10.1)
CHLORIDE SERPL-SCNC: 106 MMOL/L (ref 98–112)
CHOLEST SMN-MCNC: 177 MG/DL (ref ?–200)
CO2 SERPL-SCNC: 28 MMOL/L (ref 21–32)
CREAT BLD-MCNC: 1.23 MG/DL (ref 0.55–1.02)
CREAT UR-SCNC: 18.9 MG/DL
DEPRECATED RDW RBC AUTO: 42.9 FL (ref 35.1–46.3)
EOSINOPHIL # BLD AUTO: 0.17 X10(3) UL (ref 0–0.7)
EOSINOPHIL NFR BLD AUTO: 2.6 %
ERYTHROCYTE [DISTWIDTH] IN BLOOD BY AUTOMATED COUNT: 12.7 % (ref 11–15)
GLOBULIN PLAS-MCNC: 3.7 G/DL (ref 2.8–4.4)
GLUCOSE BLD-MCNC: 92 MG/DL (ref 70–99)
HCT VFR BLD AUTO: 42.9 % (ref 35–48)
HDLC SERPL-MCNC: 39 MG/DL (ref 40–59)
HGB BLD-MCNC: 14 G/DL (ref 12–16)
IMM GRANULOCYTES # BLD AUTO: 0.01 X10(3) UL (ref 0–1)
IMM GRANULOCYTES NFR BLD: 0.2 %
LDLC SERPL CALC-MCNC: 104 MG/DL (ref ?–100)
LYMPHOCYTES # BLD AUTO: 2.29 X10(3) UL (ref 1–4)
LYMPHOCYTES NFR BLD AUTO: 34.5 %
M PROTEIN MFR SERPL ELPH: 7.6 G/DL (ref 6.4–8.2)
MCH RBC QN AUTO: 30.3 PG (ref 26–34)
MCHC RBC AUTO-ENTMCNC: 32.6 G/DL (ref 31–37)
MCV RBC AUTO: 92.9 FL (ref 80–100)
MICROALBUMIN UR-MCNC: <0.5 MG/DL
MONOCYTES # BLD AUTO: 0.55 X10(3) UL (ref 0.1–1)
MONOCYTES NFR BLD AUTO: 8.3 %
NEUTROPHILS # BLD AUTO: 3.55 X10 (3) UL (ref 1.5–7.7)
NEUTROPHILS # BLD AUTO: 3.55 X10(3) UL (ref 1.5–7.7)
NEUTROPHILS NFR BLD AUTO: 53.3 %
NONHDLC SERPL-MCNC: 138 MG/DL (ref ?–130)
OSMOLALITY SERPL CALC.SUM OF ELEC: 293 MOSM/KG (ref 275–295)
PATIENT FASTING Y/N/NP: YES
PATIENT FASTING Y/N/NP: YES
PLATELET # BLD AUTO: 212 10(3)UL (ref 150–450)
POTASSIUM SERPL-SCNC: 4.7 MMOL/L (ref 3.5–5.1)
RBC # BLD AUTO: 4.62 X10(6)UL (ref 3.8–5.3)
SODIUM SERPL-SCNC: 139 MMOL/L (ref 136–145)
T4 FREE SERPL-MCNC: 1.6 NG/DL (ref 0.8–1.7)
TRIGL SERPL-MCNC: 171 MG/DL (ref 30–149)
TSI SER-ACNC: 0.71 MIU/ML (ref 0.36–3.74)
VLDLC SERPL CALC-MCNC: 34 MG/DL (ref 0–30)
WBC # BLD AUTO: 6.6 X10(3) UL (ref 4–11)

## 2020-08-21 PROCEDURE — 85025 COMPLETE CBC W/AUTO DIFF WBC: CPT

## 2020-08-21 PROCEDURE — 82306 VITAMIN D 25 HYDROXY: CPT

## 2020-08-21 PROCEDURE — 84443 ASSAY THYROID STIM HORMONE: CPT

## 2020-08-21 PROCEDURE — 36415 COLL VENOUS BLD VENIPUNCTURE: CPT

## 2020-08-21 PROCEDURE — 82570 ASSAY OF URINE CREATININE: CPT

## 2020-08-21 PROCEDURE — 80061 LIPID PANEL: CPT

## 2020-08-21 PROCEDURE — 82043 UR ALBUMIN QUANTITATIVE: CPT

## 2020-08-21 PROCEDURE — 84439 ASSAY OF FREE THYROXINE: CPT

## 2020-08-21 PROCEDURE — 80053 COMPREHEN METABOLIC PANEL: CPT

## 2020-08-24 LAB — 25(OH)D3 SERPL-MCNC: 31.9 NG/ML (ref 30–100)

## 2020-08-27 NOTE — PROGRESS NOTES
[address] [facility] [place] are all stable. Mild worsening of cholesterol.  No changes to medications for now. - Dr. Sandra Gregorio

## 2020-09-11 ENCOUNTER — HOSPITAL ENCOUNTER (OUTPATIENT)
Dept: BONE DENSITY | Age: 65
Discharge: HOME | End: 2020-09-11
Attending: FAMILY MEDICINE
Payer: MEDICARE

## 2020-09-11 DIAGNOSIS — Z78.0 POSTMENOPAUSAL: ICD-10-CM

## 2020-09-11 DIAGNOSIS — M85.80 OSTEOPENIA, UNSPECIFIED LOCATION: ICD-10-CM

## 2020-09-11 PROCEDURE — 77080 DXA BONE DENSITY AXIAL: CPT | Performed by: FAMILY MEDICINE

## 2020-09-13 NOTE — PROGRESS NOTES
Vincenzo Casillas - You have mild bone loss called osteopenia in your left upper leg but normal elsewhere. Continue regular exercise and vitamin D supplements to help build bone.  You will repeat this test in 2 years. - Dr. Bon King

## 2020-09-28 ENCOUNTER — OFFICE VISIT (OUTPATIENT)
Dept: FAMILY MEDICINE CLINIC | Facility: CLINIC | Age: 65
End: 2020-09-28
Payer: COMMERCIAL

## 2020-09-28 VITALS
DIASTOLIC BLOOD PRESSURE: 78 MMHG | WEIGHT: 158 LBS | HEART RATE: 82 BPM | BODY MASS INDEX: 26.33 KG/M2 | HEIGHT: 65 IN | TEMPERATURE: 98 F | SYSTOLIC BLOOD PRESSURE: 126 MMHG

## 2020-09-28 DIAGNOSIS — Z85.828 PERSONAL HISTORY OF SKIN CANCER: ICD-10-CM

## 2020-09-28 DIAGNOSIS — E03.8 OTHER SPECIFIED HYPOTHYROIDISM: ICD-10-CM

## 2020-09-28 DIAGNOSIS — Z00.00 ENCOUNTER FOR ANNUAL HEALTH EXAMINATION: ICD-10-CM

## 2020-09-28 DIAGNOSIS — E78.2 MIXED HYPERLIPIDEMIA: Primary | ICD-10-CM

## 2020-09-28 DIAGNOSIS — N18.30 CKD (CHRONIC KIDNEY DISEASE) STAGE 3, GFR 30-59 ML/MIN (HCC): Chronic | ICD-10-CM

## 2020-09-28 PROCEDURE — 99214 OFFICE O/P EST MOD 30 MIN: CPT | Performed by: FAMILY MEDICINE

## 2020-09-28 PROCEDURE — 3008F BODY MASS INDEX DOCD: CPT | Performed by: FAMILY MEDICINE

## 2020-09-28 PROCEDURE — 3078F DIAST BP <80 MM HG: CPT | Performed by: FAMILY MEDICINE

## 2020-09-28 PROCEDURE — G0463 HOSPITAL OUTPT CLINIC VISIT: HCPCS | Performed by: FAMILY MEDICINE

## 2020-09-28 PROCEDURE — 3074F SYST BP LT 130 MM HG: CPT | Performed by: FAMILY MEDICINE

## 2020-09-28 RX ORDER — A/SINGAPORE/GP1908/2015 IVR-180 (AN A/MICHIGAN/45/2015 (H1N1)PDM09-LIKE VIRUS, A/HONG KONG/4801/2014, NYMC X-263B (H3N2) (AN A/HONG KONG/4801/2014-LIKE VIRUS), AND B/BRISBANE/60/2008, WILD TYPE (A B/BRISBANE/60/2008-LIKE VIRUS) 15; 15; 15 UG/.5ML; UG/.5ML; UG/.5ML
INJECTION, SUSPENSION INTRAMUSCULAR
COMMUNITY
Start: 2020-09-14 | End: 2021-05-29 | Stop reason: ALTCHOICE

## 2020-09-28 NOTE — PATIENT INSTRUCTIONS
Nneka Britt's SCREENING SCHEDULE   Tests on this list are recommended by your physician but may not be covered, or covered at this frequency, by your insurer. Please check with your insurance carrier before scheduling to verify coverage.    JEREMÍAS more often if abnormal Colonoscopy due on 05/17/2022 Update Health Maintenance if applicable    Flex Sigmoidoscopy Screen  Covered every 5 years No results found for this or any previous visit. No flowsheet data found.      Fecal Occult Blood   Covered Joelle Pneumococcal 23 (Pneumovax)  Covered Once after 72 Orders placed or performed in visit on 09/13/19   • PNEUMOCOCCAL IMM (PNEUMOVAX)    Please get once after your 65th birthday    Hepatitis B for Moderate/High Risk       No orders found for this or any prev

## 2020-09-28 NOTE — PROGRESS NOTES
HPI:   Jaydon Tapia is a 77year old female who presents for a Medicare Subsequent Annual Wellness visit (Pt already had Initial Annual Wellness). Pt here for regular physical. Been staying active and trying to stay healthy.    Annual Physical due (L) 08/21/2020     (H) 08/21/2020    TRIG 171 (H) 08/21/2020          Last Chemistry Labs:   Lab Results   Component Value Date    AST 17 08/21/2020    ALT 24 08/21/2020    CA 9.9 08/21/2020    ALB 3.9 08/21/2020    TSH 0.706 08/21/2020    CREALEX thyroid history  ALL/ASTHMA: denies hx of allergy or asthma    EXAM:   /78   Pulse 82   Temp 98.2 °F (36.8 °C) (Oral)   Ht 5' 5\" (1.651 m)   Wt 158 lb (71.7 kg)   BMI 26.29 kg/m²  Estimated body mass index is 26.29 kg/m² as calculated from the follo canals, both ears   Nose: Nares normal, septum midline,mucosa normal, no drainage or sinus tenderness   Throat: Lips, mucosa, and tongue normal; teeth and gums normal   Neck: Supple, symmetrical, trachea midline, no adenopathy;  thyroid: not enlarged, symm and screening tests. Repeat mammogram in December. Diet assessment: good     PLAN:  The patient indicates understanding of these issues and agrees to the plan. Reinforced healthy diet, lifestyle, and exercise. No follow-ups on file.      Thien Goel data found. Pap and Pelvic      Pap: Every 3 yrs age 21-68 or Pap+HPV every 5 yrs age 33-67, age 72 and older at high risk There are no preventive care reminders to display for this patient.  Update Health Maintenance if applicable    Chlamydia  Annually DIGOXIN, DIG, VALP No flowsheet data found.                  Template: VILMA COWART MEDICARE ANNUAL ASSESSMENT FEMALE [[id number]]

## 2020-10-09 RX ORDER — LISINOPRIL 5 MG/1
5 TABLET ORAL DAILY
Qty: 90 TABLET | Refills: 2 | Status: SHIPPED | OUTPATIENT
Start: 2020-10-09 | End: 2021-07-08

## 2020-11-16 ENCOUNTER — OFFICE VISIT (OUTPATIENT)
Dept: DERMATOLOGY CLINIC | Facility: CLINIC | Age: 65
End: 2020-11-16
Payer: COMMERCIAL

## 2020-11-16 DIAGNOSIS — L57.8 SUN-DAMAGED SKIN: ICD-10-CM

## 2020-11-16 DIAGNOSIS — D23.30 BENIGN NEOPLASM OF SKIN OF FACE: ICD-10-CM

## 2020-11-16 DIAGNOSIS — Z85.828 PERSONAL HISTORY OF SKIN CANCER: Primary | ICD-10-CM

## 2020-11-16 DIAGNOSIS — D18.01 HEMANGIOMA OF SKIN AND SUBCUTANEOUS TISSUE: ICD-10-CM

## 2020-11-16 DIAGNOSIS — D23.4 BENIGN NEOPLASM OF SCALP AND SKIN OF NECK: ICD-10-CM

## 2020-11-16 DIAGNOSIS — L81.4 SOLAR LENTIGO: ICD-10-CM

## 2020-11-16 DIAGNOSIS — D23.5 BENIGN NEOPLASM OF SKIN OF TRUNK, EXCEPT SCROTUM: ICD-10-CM

## 2020-11-16 DIAGNOSIS — L82.1 SEBORRHEIC KERATOSES: ICD-10-CM

## 2020-11-16 PROCEDURE — G0463 HOSPITAL OUTPT CLINIC VISIT: HCPCS | Performed by: DERMATOLOGY

## 2020-11-16 PROCEDURE — 99213 OFFICE O/P EST LOW 20 MIN: CPT | Performed by: DERMATOLOGY

## 2020-11-16 NOTE — PROGRESS NOTES
HPI:     Chief Complaint     Derm Problem        HPI     Derm Problem      Additional comments: established pt, seen 1 year ago, presents for \"follow up\" She denies any skin concerns. Pt with hx of BCC to right preauricular region 2018.            Last e Diagnosis Date   • BCC (basal cell carcinoma of skin) 2018    right prearuicular    • Disorder of thyroid    • High cholesterol    • Hyperlipidemia    • Hypothyroid    • PONV (postoperative nausea and vomiting)    • Renal disorder    • Skin cancer 2018 Physically abused: Not on file        Forced sexual activity: Not on file    Other Topics      Concerns:        Grew up on a farm: Not Asked        History of tanning: Not Asked        Outdoor occupation: Not Asked        Breast feeding: Not Asked application of broad-spectrum sunblock SPF 30 or higher discussed. Patient understands to put it on  15-20 minutes before outsides so that  it is absorbed and working and to reapply it every few hours.   Hemangiomas–reassurance–no treatment  Seborrheic ker

## 2020-12-04 ENCOUNTER — HOSPITAL ENCOUNTER (OUTPATIENT)
Dept: ULTRASOUND IMAGING | Facility: HOSPITAL | Age: 65
Discharge: HOME | End: 2020-12-04
Attending: FAMILY MEDICINE
Payer: MEDICARE

## 2020-12-04 ENCOUNTER — HOSPITAL ENCOUNTER (OUTPATIENT)
Dept: MAMMOGRAPHY | Facility: HOSPITAL | Age: 65
Discharge: HOME | End: 2020-12-04
Attending: FAMILY MEDICINE
Payer: MEDICARE

## 2020-12-04 DIAGNOSIS — R92.8 ABNORMAL MAMMOGRAM: ICD-10-CM

## 2020-12-04 PROCEDURE — 77066 DX MAMMO INCL CAD BI: CPT | Performed by: FAMILY MEDICINE

## 2020-12-04 PROCEDURE — 77062 BREAST TOMOSYNTHESIS BI: CPT | Performed by: FAMILY MEDICINE

## 2020-12-04 PROCEDURE — 76642 ULTRASOUND BREAST LIMITED: CPT | Performed by: FAMILY MEDICINE

## 2020-12-06 DIAGNOSIS — R92.8 ABNORMAL MAMMOGRAM: Primary | ICD-10-CM

## 2020-12-06 NOTE — PROGRESS NOTES
Ramesh Early - The Radiologist recommends continued surveillance of your left breast so will repeat the ultrasound in 6 months.  I will place the order. - Dr. Pichardo Shells

## 2021-01-11 RX ORDER — ATORVASTATIN CALCIUM 20 MG/1
TABLET, FILM COATED ORAL
Qty: 90 TABLET | Refills: 1 | Status: SHIPPED | OUTPATIENT
Start: 2021-01-11 | End: 2021-07-12

## 2021-02-01 RX ORDER — LEVOTHYROXINE SODIUM 0.12 MG/1
125 TABLET ORAL
Qty: 90 TABLET | Refills: 1 | Status: SHIPPED | OUTPATIENT
Start: 2021-02-01 | End: 2021-09-03

## 2021-03-13 DIAGNOSIS — Z23 NEED FOR VACCINATION: ICD-10-CM

## 2021-05-29 ENCOUNTER — OFFICE VISIT (OUTPATIENT)
Dept: FAMILY MEDICINE CLINIC | Facility: CLINIC | Age: 66
End: 2021-05-29
Payer: COMMERCIAL

## 2021-05-29 ENCOUNTER — LAB ENCOUNTER (OUTPATIENT)
Dept: LAB | Age: 66
End: 2021-05-29
Attending: FAMILY MEDICINE
Payer: MEDICARE

## 2021-05-29 VITALS
WEIGHT: 161 LBS | BODY MASS INDEX: 26.82 KG/M2 | DIASTOLIC BLOOD PRESSURE: 68 MMHG | HEART RATE: 60 BPM | SYSTOLIC BLOOD PRESSURE: 126 MMHG | TEMPERATURE: 97 F | HEIGHT: 65 IN

## 2021-05-29 DIAGNOSIS — N18.30 STAGE 3 CHRONIC KIDNEY DISEASE, UNSPECIFIED WHETHER STAGE 3A OR 3B CKD (HCC): ICD-10-CM

## 2021-05-29 DIAGNOSIS — E55.9 VITAMIN D DEFICIENCY: ICD-10-CM

## 2021-05-29 DIAGNOSIS — Z00.00 ENCOUNTER FOR ANNUAL HEALTH EXAMINATION: ICD-10-CM

## 2021-05-29 DIAGNOSIS — R20.2 PARESTHESIA OF BOTH HANDS: ICD-10-CM

## 2021-05-29 DIAGNOSIS — E03.8 OTHER SPECIFIED HYPOTHYROIDISM: ICD-10-CM

## 2021-05-29 DIAGNOSIS — Z85.828 PERSONAL HISTORY OF SKIN CANCER: ICD-10-CM

## 2021-05-29 DIAGNOSIS — E78.2 MIXED HYPERLIPIDEMIA: Primary | ICD-10-CM

## 2021-05-29 PROCEDURE — 3008F BODY MASS INDEX DOCD: CPT | Performed by: FAMILY MEDICINE

## 2021-05-29 PROCEDURE — 85025 COMPLETE CBC W/AUTO DIFF WBC: CPT | Performed by: FAMILY MEDICINE

## 2021-05-29 PROCEDURE — 99397 PER PM REEVAL EST PAT 65+ YR: CPT | Performed by: FAMILY MEDICINE

## 2021-05-29 PROCEDURE — 3074F SYST BP LT 130 MM HG: CPT | Performed by: FAMILY MEDICINE

## 2021-05-29 PROCEDURE — 36415 COLL VENOUS BLD VENIPUNCTURE: CPT | Performed by: FAMILY MEDICINE

## 2021-05-29 PROCEDURE — 80061 LIPID PANEL: CPT | Performed by: FAMILY MEDICINE

## 2021-05-29 PROCEDURE — G0439 PPPS, SUBSEQ VISIT: HCPCS | Performed by: FAMILY MEDICINE

## 2021-05-29 PROCEDURE — L3908 WHO COCK-UP NONMOLDE PRE OTS: HCPCS | Performed by: FAMILY MEDICINE

## 2021-05-29 PROCEDURE — 84443 ASSAY THYROID STIM HORMONE: CPT | Performed by: FAMILY MEDICINE

## 2021-05-29 PROCEDURE — 82306 VITAMIN D 25 HYDROXY: CPT | Performed by: FAMILY MEDICINE

## 2021-05-29 PROCEDURE — 80053 COMPREHEN METABOLIC PANEL: CPT | Performed by: FAMILY MEDICINE

## 2021-05-29 PROCEDURE — 82607 VITAMIN B-12: CPT | Performed by: FAMILY MEDICINE

## 2021-05-29 PROCEDURE — 3078F DIAST BP <80 MM HG: CPT | Performed by: FAMILY MEDICINE

## 2021-05-29 PROCEDURE — 84439 ASSAY OF FREE THYROXINE: CPT | Performed by: FAMILY MEDICINE

## 2021-05-29 PROCEDURE — 96160 PT-FOCUSED HLTH RISK ASSMT: CPT | Performed by: FAMILY MEDICINE

## 2021-05-29 NOTE — PROGRESS NOTES
HPI:   Johana Swenson is a 74year old female who presents for a Medicare Subsequent Annual Wellness visit (Pt already had Initial Annual Wellness). Did receive Covid vaccines and did well. Also received Shingrix. Staying active and eating healthy. cancer    Wt Readings from Last 3 Encounters:  05/29/21 : 161 lb (73 kg)  09/28/20 : 158 lb (71.7 kg)  03/02/20 : 159 lb (72.1 kg)     Last Cholesterol Labs:   Lab Results   Component Value Date    CHOLEST 177 08/21/2020    HDL 39 (L) 08/21/2020     tobacco. She reports current alcohol use. She reports that she does not use drugs.      REVIEW OF SYSTEMS:   GENERAL: feels well otherwise  SKIN: denies any unusual skin lesions  EYES: denies blurred vision or double vision  HEENT: denies nasal congestion, place of [facility]: No   Many people I talk to seem to mumble (or don't speak clearly): No People get annoyed because I misunderstand what they say: No   I misunderstand what others are saying and make inappropriate responses: No I avoid social activities be Influenza 10/05/2015   • Intranasal (CPT=90660), Influenza Vaccine, Flu Clinic 10/08/2014   • Pneumococcal (Prevnar 13) 08/21/2018   • Pneumovax 23 09/13/2019   • Zoster Vaccine Live (Zostavax) 11/03/2017   • Zoster Vaccine Recombinant Adjuvanted (Shingrix provided for quick review of chart, separate sheet to patient  [address] Internal Lab or Procedure External Lab or Procedure   Diabetes Screening      HbgA1C   Annually No results found for: A1C No flowsheet data found Please get once after your 65th birthday    Hepatitis B for Moderate/High Risk No vaccine history found Medium/high risk factors:   End-stage renal disease   Hemophiliacs who received Factor VIII or IX concentrates   Clients of institutions for the [facility]

## 2021-06-06 NOTE — PROGRESS NOTES
Ramesh Early - your labs show that your thyroid levels are abnormal and getting too much replacement. I need to change your levothyroxine to 112 mcg daily and repeat thyroid labs in 2 months. Your kidney function declined slightly.  I would like you to see a

## 2021-06-07 ENCOUNTER — HOSPITAL ENCOUNTER (OUTPATIENT)
Dept: ULTRASOUND IMAGING | Facility: HOSPITAL | Age: 66
Discharge: HOME | End: 2021-06-07
Attending: FAMILY MEDICINE
Payer: MEDICARE

## 2021-06-07 DIAGNOSIS — R92.8 ABNORMAL MAMMOGRAM: ICD-10-CM

## 2021-06-07 PROCEDURE — 76642 ULTRASOUND BREAST LIMITED: CPT | Performed by: FAMILY MEDICINE

## 2021-06-17 ENCOUNTER — TELEMEDICINE (OUTPATIENT)
Dept: FAMILY MEDICINE CLINIC | Facility: CLINIC | Age: 66
End: 2021-06-17

## 2021-06-17 DIAGNOSIS — R05.9 COUGH: Primary | ICD-10-CM

## 2021-06-17 PROCEDURE — G2012 BRIEF CHECK IN BY MD/QHP: HCPCS | Performed by: PHYSICIAN ASSISTANT

## 2021-06-17 RX ORDER — CODEINE PHOSPHATE AND GUAIFENESIN 10; 100 MG/5ML; MG/5ML
5 SOLUTION ORAL EVERY 6 HOURS PRN
Qty: 140 ML | Refills: 0 | Status: SHIPPED | OUTPATIENT
Start: 2021-06-17

## 2021-06-17 NOTE — PROGRESS NOTES
Please note that the following visit was completed using two-way, real-time interactive audio and/or video communication.   This has been done in good giselle to provide continuity of care in the best interest of the provider-patient relationship, due to the 140 mL 0   • Levothyroxine Sodium 112 MCG Oral Tab Take 1 tablet (112 mcg total) by mouth before breakfast. 90 tablet 2   • LEVOTHYROXINE SODIUM 125 MCG Oral Tab TAKE 1 TABLET (125 MCG TOTAL) BY MOUTH BEFORE BREAKFAST.  90 tablet 1   • ATORVASTATIN 20 MG Or thyroid    • High cholesterol    • Hyperlipidemia    • Hypothyroid    • PONV (postoperative nausea and vomiting)    • Renal disorder    • Skin cancer 2018    BCC right preauricular         PHYSICAL EXAM:     Vitals signs taken at home if available:    Mackenzie Ryder

## 2021-07-08 RX ORDER — LISINOPRIL 5 MG/1
TABLET ORAL
Qty: 90 TABLET | Refills: 2 | Status: SHIPPED | OUTPATIENT
Start: 2021-07-08

## 2021-07-12 RX ORDER — ATORVASTATIN CALCIUM 20 MG/1
20 TABLET, FILM COATED ORAL NIGHTLY
Qty: 90 TABLET | Refills: 1 | Status: SHIPPED | OUTPATIENT
Start: 2021-07-12 | End: 2022-02-28

## 2021-07-13 ENCOUNTER — OFFICE VISIT (OUTPATIENT)
Dept: NEPHROLOGY | Facility: CLINIC | Age: 66
End: 2021-07-13
Payer: COMMERCIAL

## 2021-07-13 ENCOUNTER — TELEPHONE (OUTPATIENT)
Dept: NEPHROLOGY | Facility: CLINIC | Age: 66
End: 2021-07-13

## 2021-07-13 VITALS
WEIGHT: 162 LBS | BODY MASS INDEX: 26.99 KG/M2 | HEART RATE: 79 BPM | DIASTOLIC BLOOD PRESSURE: 74 MMHG | HEIGHT: 65 IN | SYSTOLIC BLOOD PRESSURE: 127 MMHG

## 2021-07-13 DIAGNOSIS — E55.9 VITAMIN D DEFICIENCY: ICD-10-CM

## 2021-07-13 DIAGNOSIS — E83.52 HYPERCALCEMIA: ICD-10-CM

## 2021-07-13 DIAGNOSIS — N18.31 STAGE 3A CHRONIC KIDNEY DISEASE (HCC): Primary | ICD-10-CM

## 2021-07-13 PROCEDURE — 3008F BODY MASS INDEX DOCD: CPT | Performed by: INTERNAL MEDICINE

## 2021-07-13 PROCEDURE — 99205 OFFICE O/P NEW HI 60 MIN: CPT | Performed by: INTERNAL MEDICINE

## 2021-07-13 PROCEDURE — 3074F SYST BP LT 130 MM HG: CPT | Performed by: INTERNAL MEDICINE

## 2021-07-13 PROCEDURE — 3078F DIAST BP <80 MM HG: CPT | Performed by: INTERNAL MEDICINE

## 2021-07-13 NOTE — PATIENT INSTRUCTIONS
Vitamin D3 at 2000 units   Kidney ultrasound of kidney - call to make an appointment  Urine test as ordered   Blood test in one week   Follow up in 6 weeks   Increase water to 65-70 ounces a day   Stop calcium supplement

## 2021-07-13 NOTE — TELEPHONE ENCOUNTER
Patient had a consult with Dr. Kiara Castro today and was advised to schedule a follow-up in 6 weeks. Dr. Kiara Castro does not have availability during that time frame. Please contact patient to schedule 6 week follow-up.

## 2021-07-13 NOTE — PROGRESS NOTES
Consult Requested By: Dr. Junior Robbins    Reason for Consult: CKD stage III    HPI:     Patient is a 74 yrs old female with pmh of HL, hypothyroidism who presented for work up and evaluation of kidney disease     Lab test showed BUN/Cr 26/1.33 mg/dl Levothyroxine Sodium 112 MCG Oral Tab Take 1 tablet (112 mcg total) by mouth before breakfast. 90 tablet 2   • LEVOTHYROXINE SODIUM 125 MCG Oral Tab TAKE 1 TABLET (125 MCG TOTAL) BY MOUTH BEFORE BREAKFAST.  90 tablet 1   • Cholecalciferol (VITAMIN D3) 400 u bilaterally  Cardiovascular: regular rate and rhythm  Abdomen: soft, non-tender, non-distended, BS normal  Skin/Hair: no unusual rashes present  Back/Spine: no abnormalities noted  Musculoskeletal:  no deformities  Extremities: no edema  Neurological:  Beatris

## 2021-07-21 ENCOUNTER — LAB ENCOUNTER (OUTPATIENT)
Dept: LAB | Age: 66
End: 2021-07-21
Attending: INTERNAL MEDICINE
Payer: MEDICARE

## 2021-07-21 DIAGNOSIS — E55.9 VITAMIN D DEFICIENCY: ICD-10-CM

## 2021-07-21 DIAGNOSIS — N18.31 STAGE 3A CHRONIC KIDNEY DISEASE (HCC): ICD-10-CM

## 2021-07-21 DIAGNOSIS — E83.52 HYPERCALCEMIA: ICD-10-CM

## 2021-07-21 LAB
ANION GAP SERPL CALC-SCNC: 5 MMOL/L (ref 0–18)
BILIRUB UR QL: NEGATIVE
BUN BLD-MCNC: 28 MG/DL (ref 7–18)
BUN/CREAT SERPL: 20.7 (ref 10–20)
CALCIUM BLD-MCNC: 9.8 MG/DL (ref 8.5–10.1)
CHLORIDE SERPL-SCNC: 108 MMOL/L (ref 98–112)
CLARITY UR: CLEAR
CO2 SERPL-SCNC: 27 MMOL/L (ref 21–32)
COLOR UR: COLORLESS
CREAT BLD-MCNC: 1.35 MG/DL
CREAT UR-SCNC: 16.9 MG/DL
GLUCOSE BLD-MCNC: 89 MG/DL (ref 70–99)
GLUCOSE UR-MCNC: NEGATIVE MG/DL
HGB UR QL STRIP.AUTO: NEGATIVE
IGA SERPL-MCNC: 202 MG/DL (ref 70–312)
IGM SERPL-MCNC: 33 MG/DL (ref 43–279)
IMMUNOGLOBULIN PNL SER-MCNC: 1100 MG/DL (ref 791–1643)
KETONES UR-MCNC: NEGATIVE MG/DL
NITRITE UR QL STRIP.AUTO: NEGATIVE
OSMOLALITY SERPL CALC.SUM OF ELEC: 295 MOSM/KG (ref 275–295)
PATIENT FASTING Y/N/NP: NO
PH UR: 6 [PH] (ref 5–8)
POTASSIUM SERPL-SCNC: 4.1 MMOL/L (ref 3.5–5.1)
PROT UR-MCNC: <5 MG/DL
PROT UR-MCNC: NEGATIVE MG/DL
PTH-INTACT SERPL-MCNC: 68.9 PG/ML (ref 18.5–88)
SODIUM SERPL-SCNC: 140 MMOL/L (ref 136–145)
SP GR UR STRIP: 1 (ref 1–1.03)
UROBILINOGEN UR STRIP-ACNC: <2

## 2021-07-21 PROCEDURE — 82570 ASSAY OF URINE CREATININE: CPT

## 2021-07-21 PROCEDURE — 83970 ASSAY OF PARATHORMONE: CPT | Performed by: INTERNAL MEDICINE

## 2021-07-21 PROCEDURE — 83883 ASSAY NEPHELOMETRY NOT SPEC: CPT

## 2021-07-21 PROCEDURE — 36415 COLL VENOUS BLD VENIPUNCTURE: CPT | Performed by: INTERNAL MEDICINE

## 2021-07-21 PROCEDURE — 82306 VITAMIN D 25 HYDROXY: CPT

## 2021-07-21 PROCEDURE — 80048 BASIC METABOLIC PNL TOTAL CA: CPT | Performed by: FAMILY MEDICINE

## 2021-07-21 PROCEDURE — 84156 ASSAY OF PROTEIN URINE: CPT

## 2021-07-21 PROCEDURE — 84165 PROTEIN E-PHORESIS SERUM: CPT

## 2021-07-21 PROCEDURE — 82784 ASSAY IGA/IGD/IGG/IGM EACH: CPT | Performed by: INTERNAL MEDICINE

## 2021-07-21 PROCEDURE — 81001 URINALYSIS AUTO W/SCOPE: CPT | Performed by: INTERNAL MEDICINE

## 2021-07-21 PROCEDURE — 84443 ASSAY THYROID STIM HORMONE: CPT | Performed by: FAMILY MEDICINE

## 2021-07-21 PROCEDURE — 84439 ASSAY OF FREE THYROXINE: CPT | Performed by: FAMILY MEDICINE

## 2021-07-22 LAB
KAPPA LC FREE SER-MCNC: 2.42 MG/DL (ref 0.33–1.94)
KAPPA LC FREE/LAMBDA FREE SER NEPH: 1.61 {RATIO} (ref 0.26–1.65)
LAMBDA LC FREE SERPL-MCNC: 1.5 MG/DL (ref 0.57–2.63)

## 2021-07-23 LAB
25(OH)D3 SERPL-MCNC: 39.7 NG/ML (ref 30–100)
ALBUMIN SERPL ELPH-MCNC: 4.38 G/DL (ref 3.75–5.21)
ALBUMIN/GLOB SERPL: 1.56 {RATIO} (ref 1–2)
ALPHA1 GLOB SERPL ELPH-MCNC: 0.29 G/DL (ref 0.19–0.46)
ALPHA2 GLOB SERPL ELPH-MCNC: 0.75 G/DL (ref 0.48–1.05)
B-GLOBULIN SERPL ELPH-MCNC: 0.73 G/DL (ref 0.68–1.23)
GAMMA GLOB SERPL ELPH-MCNC: 1.04 G/DL (ref 0.62–1.7)
M PROTEIN MFR SERPL ELPH: 7.2 G/DL (ref 6.4–8.2)

## 2021-08-06 ENCOUNTER — HOSPITAL ENCOUNTER (OUTPATIENT)
Dept: ULTRASOUND IMAGING | Facility: HOSPITAL | Age: 66
Discharge: HOME | End: 2021-08-06
Attending: INTERNAL MEDICINE
Payer: MEDICARE

## 2021-08-06 DIAGNOSIS — N18.31 STAGE 3A CHRONIC KIDNEY DISEASE (HCC): ICD-10-CM

## 2021-08-06 PROCEDURE — 76770 US EXAM ABDO BACK WALL COMP: CPT | Performed by: INTERNAL MEDICINE

## 2021-08-30 ENCOUNTER — TELEMEDICINE (OUTPATIENT)
Dept: NEPHROLOGY | Facility: CLINIC | Age: 66
End: 2021-08-30

## 2021-08-30 DIAGNOSIS — N18.31 STAGE 3A CHRONIC KIDNEY DISEASE (HCC): Primary | ICD-10-CM

## 2021-08-30 PROCEDURE — 99213 OFFICE O/P EST LOW 20 MIN: CPT | Performed by: INTERNAL MEDICINE

## 2021-08-30 NOTE — PROGRESS NOTES
HPI:     Patient is a 74 yrs old female with pmh of CKD stage III, HL, hypothyroidism who presented for follow up via video visit     Lab test showed BUN/Cr 26/1.33 mg/dl with an eGFR 41 ml/min. Calcium 10. 3. creatinine stable at 1.2 mg/dl since 2019 every 6 (six) hours as needed for cough. Medication may causes sedation. Not to operate heavy machinery or drive on medication.  140 mL 0   • Levothyroxine Sodium 112 MCG Oral Tab Take 1 tablet (112 mcg total) by mouth before breakfast. 90 tablet 2   • LEVO stage III:  - BUN/Cr 26/1.33 mg/dl with an eGFR 41 ml/min. - stable  - creatinine stable at 1.2 mg/dl since 2019 with an eGFR 45 ml/min.    - Serum calcium and albumin wnl.   - UACR unremarkable   - UA, urine protein unremarkable  -  renal US slightly small

## 2021-09-03 RX ORDER — LEVOTHYROXINE SODIUM 0.12 MG/1
125 TABLET ORAL
Qty: 90 TABLET | Refills: 1 | Status: SHIPPED | OUTPATIENT
Start: 2021-09-03 | End: 2022-03-03

## 2021-09-03 NOTE — TELEPHONE ENCOUNTER
Refill passed per [address] [place] protocol. Requested Prescriptions   Pending Prescriptions Disp Refills    LEVOTHYROXINE 125 MCG Oral Tab [Pharmacy Med Name: LEVOTHYROXINE 125 MCG TABLET] 90 tablet 1     Sig: TAKE 1 TABLET (125 MCG TOTAL) BY MOUTH BEFORE BREAKFAST.         Thyroid Medication Protocol Passed - 9/3/2021  4:28 PM        Passed - TSH in past 12 months        Passed - Last TSH value is normal     Lab Results   Component Value Date    TSH 0.468 07/21/2021                 Passed - Appointment in past 12 or next 3 months            Future Appointments         Provider Department Appt Notes    In 2 months Bailey Hirsch MD [facility] body ck          Recent Outpatient Visits              4 days ago Stage 3a chronic kidney disease [facility])    [address] [facility] [place], [facility], Pricila Bond MD    Telemedicine    1 month ago Stage 3a chronic kidney disease [facility])    [address] [facility], Pricila Bond MD    Office Visit    2 months ago Cough    [address] [facility] [place], [address] [place], PA-C    Telemedicine    3 months ago Mixed hyperlipidemia    [address] Kimberly Smith MD    Office Visit    9 months ago Personal history of skin cancer    [facility] Nadine Jimenez MD    Office Visit

## 2021-11-17 ENCOUNTER — OFFICE VISIT (OUTPATIENT)
Dept: DERMATOLOGY CLINIC | Facility: CLINIC | Age: 66
End: 2021-11-17
Payer: COMMERCIAL

## 2021-11-17 DIAGNOSIS — Z85.828 PERSONAL HISTORY OF SKIN CANCER: ICD-10-CM

## 2021-11-17 DIAGNOSIS — D23.30 BENIGN NEOPLASM OF SKIN OF FACE: ICD-10-CM

## 2021-11-17 DIAGNOSIS — D48.5 NEOPLASM OF UNCERTAIN BEHAVIOR OF SKIN: Primary | ICD-10-CM

## 2021-11-17 DIAGNOSIS — L57.8 SUN-DAMAGED SKIN: ICD-10-CM

## 2021-11-17 DIAGNOSIS — D23.4 BENIGN NEOPLASM OF SCALP AND SKIN OF NECK: ICD-10-CM

## 2021-11-17 DIAGNOSIS — L82.1 SEBORRHEIC KERATOSES: ICD-10-CM

## 2021-11-17 DIAGNOSIS — D23.5 BENIGN NEOPLASM OF SKIN OF TRUNK, EXCEPT SCROTUM: ICD-10-CM

## 2021-11-17 DIAGNOSIS — L81.4 SOLAR LENTIGO: ICD-10-CM

## 2021-11-17 PROCEDURE — 99213 OFFICE O/P EST LOW 20 MIN: CPT | Performed by: DERMATOLOGY

## 2021-11-17 PROCEDURE — 11102 TANGNTL BX SKIN SINGLE LES: CPT | Performed by: DERMATOLOGY

## 2021-11-22 NOTE — PROGRESS NOTES
The pathology report from last visit showed   r lower lid sk . Please log in test results, send biopsy results letter. Pt to rtc 1 year or prn.

## 2021-11-29 NOTE — PROGRESS NOTES
Salo Isaac is a 74year old female. HPI:     CC:  Patient presents with:  Full Skin Exam: LOV 11/16/20 Patient present for full body skin exam .Patient has hx of BCC        Allergies:  Patient has no known allergies.     HISTORY:    Past Medical Hist Not to operate heavy machinery or drive on medication. 140 mL 0   • Cholecalciferol (VITAMIN D3) 400 units Oral Cap Take 1 capsule by mouth daily. • Calcium Carbonate-Vit D-Min (CALCIUM 1200 OR) Take by mouth daily.          Allergies:   No Known Allerg Asked        Regular use of sun block: Not Asked    Social History Narrative      Not on file    Social Determinants of Health  Financial Resource Strain: Not on file  Food Insecurity: Not on file  Transportation Needs: Not on file  Physical Activity: Not Assessment /Plan for additional history and physical exam also:    Assessment / plan:    No orders of the defined types were placed in this encounter.       Meds & Refills for this Visit:  Requested Prescriptions      No prescriptions requested or ordered i

## 2021-11-29 NOTE — PROGRESS NOTES
Operative Report                     Shave/  Tangential biopsy     Clinical diagnosis: Rule out SCC  4 x 5 mm tan-brown slightly pearly crusted papule right medial lower lid    Size of lesion:    Location:    Procedure:     With patient in appropriate

## 2021-12-07 ENCOUNTER — HOSPITAL ENCOUNTER (OUTPATIENT)
Dept: ULTRASOUND IMAGING | Facility: HOSPITAL | Age: 66
Discharge: HOME | End: 2021-12-07
Attending: FAMILY MEDICINE
Payer: MEDICARE

## 2021-12-07 ENCOUNTER — HOSPITAL ENCOUNTER (OUTPATIENT)
Dept: MAMMOGRAPHY | Facility: HOSPITAL | Age: 66
Discharge: HOME | End: 2021-12-07
Attending: FAMILY MEDICINE
Payer: MEDICARE

## 2021-12-07 DIAGNOSIS — R92.8 ABNORMAL MAMMOGRAM: ICD-10-CM

## 2021-12-07 PROCEDURE — 76642 ULTRASOUND BREAST LIMITED: CPT | Performed by: FAMILY MEDICINE

## 2021-12-07 PROCEDURE — 77062 BREAST TOMOSYNTHESIS BI: CPT | Performed by: FAMILY MEDICINE

## 2021-12-07 PROCEDURE — 77066 DX MAMMO INCL CAD BI: CPT | Performed by: FAMILY MEDICINE

## 2022-02-04 ENCOUNTER — TELEPHONE (OUTPATIENT)
Dept: CASE MANAGEMENT | Age: 67
End: 2022-02-04

## 2022-02-28 RX ORDER — ATORVASTATIN CALCIUM 20 MG/1
20 TABLET, FILM COATED ORAL NIGHTLY
Qty: 90 TABLET | Refills: 1 | Status: SHIPPED | OUTPATIENT
Start: 2022-02-28 | End: 2022-09-15

## 2022-02-28 NOTE — TELEPHONE ENCOUNTER
Refill passed per [email or web address] protocol.     Requested Prescriptions   Pending Prescriptions Disp Refills    ATORVASTATIN 20 MG Oral Tab [Pharmacy Med Name: ATORVASTATIN 20 MG TABLET] 90 tablet 1     Sig: TAKE 1 TABLET BY MOUTH EVERY DAY AT NIGHT        Cholesterol Medication Protocol Passed - 2/28/2022 12:00 AM        Passed - ALT in past 12 months        Passed - LDL in past 12 months        Passed - Last ALT < 80       Lab Results   Component Value Date    ALT 24 05/29/2021             Passed - Last LDL < 130     Lab Results   Component Value Date    LDL 99 05/29/2021               Passed - Appointment in past 12 or next 3 months              Future Appointments         Provider Department Appt Notes    In 3 months Demian MD [facility] [employer], [address], [address] annual exam            Recent Outpatient Visits              3 months ago Neoplasm of uncertain behavior of skin    [facility] Salbador Parra MD    Office Visit    6 months ago Stage 3a chronic kidney disease [facility])    [email or web address], [address] [facility],  [address] Annalee Torrez MD    Telemedicine    7 months ago Stage 3a chronic kidney disease [facility])    [address] [facility], [address] Ann[address] Torrez MD    Office Visit    8 months ago Cough    [place] [employer] [facility], [address], Mat Villaseñor PA-C    Telemedicine    9 months ago Mixed hyperlipidemia    [address], Crissy Lee, Robb Portillo MD    Office Visit

## 2022-03-03 ENCOUNTER — TELEMEDICINE (OUTPATIENT)
Dept: FAMILY MEDICINE CLINIC | Facility: CLINIC | Age: 67
End: 2022-03-03

## 2022-03-03 DIAGNOSIS — J01.10 ACUTE FRONTAL SINUSITIS, RECURRENCE NOT SPECIFIED: Primary | ICD-10-CM

## 2022-03-03 DIAGNOSIS — R05.9 COUGH: ICD-10-CM

## 2022-03-03 PROCEDURE — 99214 OFFICE O/P EST MOD 30 MIN: CPT | Performed by: NURSE PRACTITIONER

## 2022-03-03 RX ORDER — CODEINE PHOSPHATE AND GUAIFENESIN 10; 100 MG/5ML; MG/5ML
5 SOLUTION ORAL EVERY 6 HOURS PRN
Qty: 118 ML | Refills: 0 | Status: SHIPPED | OUTPATIENT
Start: 2022-03-03

## 2022-03-03 RX ORDER — AMOXICILLIN AND CLAVULANATE POTASSIUM 875; 125 MG/1; MG/1
1 TABLET, FILM COATED ORAL 2 TIMES DAILY
Qty: 20 TABLET | Refills: 0 | Status: SHIPPED | OUTPATIENT
Start: 2022-03-03 | End: 2022-03-13

## 2022-03-10 ENCOUNTER — PATIENT MESSAGE (OUTPATIENT)
Dept: FAMILY MEDICINE CLINIC | Facility: CLINIC | Age: 67
End: 2022-03-10

## 2022-03-10 NOTE — TELEPHONE ENCOUNTER
From: Kumar Britt  To: Fern Bell MD  Sent: 3/10/2022 10:31 AM CST  Subject: sinus infection    Have been taking amoxicillin since 3/3/22. Do see definite improvement but still not completely clear. have some congestion and some cough still going, Should I be concerned?  Nneka Britt 04/29/1955

## 2022-03-11 RX ORDER — LEVOTHYROXINE SODIUM 112 UG/1
112 TABLET ORAL
Qty: 90 TABLET | Refills: 1 | Status: SHIPPED | OUTPATIENT
Start: 2022-03-11

## 2022-03-11 NOTE — TELEPHONE ENCOUNTER
Refill passed per [facility] protocol.      Requested Prescriptions   Pending Prescriptions Disp Refills    LISINOPRIL 5 MG Oral Tab [Pharmacy Med Name: LISINOPRIL 5 MG TABLET] 90 tablet 2     Sig: TAKE 1 TABLET BY MOUTH EVERY DAY        Hypertensive Medications Protocol Failed - 3/10/2022 10:45 AM        Failed - GFR Non- > 50     Lab Results   Component Value Date    GFRNAA 40 (L) 07/21/2021                 Passed - CMP or BMP in past 12 months        Passed - Appointment in past 6 or next 3 months           LEVOTHYROXINE 112 MCG Oral Tab [Pharmacy Med Name: LEVOTHYROXINE 112 MCG TABLET] 90 tablet 2     Sig: Take 1 tablet (112 mcg total) by mouth before breakfast.        Thyroid Medication Protocol Passed - 3/10/2022 10:45 AM        Passed - TSH in past 12 months        Passed - Last TSH value is normal     Lab Results   Component Value Date    TSH 0.468 07/21/2021                 Passed - Appointment in past 12 or next 3 months               Future Appointments         Provider Department Appt Notes    In 2 months Frank Esteban MD [facility], [facility], [address] annual exam             Recent Outpatient Visits              1 week ago Acute frontal sinusitis, recurrence not specified    [facility], [address], [address] [facility], [address] [place] [address]    3 months ago Neoplasm of uncertain behavior of skin    [facility] Bonifacio Fabian MD    Office Visit    6 months ago Stage 3a chronic kidney disease [facility])    [facility], [address] [facility], Jake Bond MD    Telemedicine    8 months ago Stage 3a chronic kidney disease [facility])    [address] [facility], Jake Bond MD    Office Visit    8 months ago Cough    [facility], [facility]

## 2022-03-14 RX ORDER — LISINOPRIL 5 MG/1
TABLET ORAL
Qty: 90 TABLET | Refills: 2 | Status: SHIPPED | OUTPATIENT
Start: 2022-03-14

## 2022-04-25 ENCOUNTER — OFFICE VISIT (OUTPATIENT)
Dept: FAMILY MEDICINE CLINIC | Facility: CLINIC | Age: 67
End: 2022-04-25
Payer: COMMERCIAL

## 2022-04-25 VITALS
SYSTOLIC BLOOD PRESSURE: 131 MMHG | TEMPERATURE: 97 F | HEART RATE: 60 BPM | DIASTOLIC BLOOD PRESSURE: 74 MMHG | WEIGHT: 162.38 LBS | BODY MASS INDEX: 27.05 KG/M2 | HEIGHT: 65 IN

## 2022-04-25 DIAGNOSIS — J34.89 SINUS PRESSURE: ICD-10-CM

## 2022-04-25 DIAGNOSIS — J02.9 SORE THROAT: Primary | ICD-10-CM

## 2022-04-25 LAB
CONTROL LINE PRESENT WITH A CLEAR BACKGROUND (YES/NO): YES YES/NO
KIT LOT #: NORMAL NUMERIC
STREP GRP A CUL-SCR: NEGATIVE

## 2022-04-25 RX ORDER — FLUTICASONE PROPIONATE 50 MCG
2 SPRAY, SUSPENSION (ML) NASAL DAILY
Qty: 18 ML | Refills: 1 | Status: SHIPPED | OUTPATIENT
Start: 2022-04-25 | End: 2023-04-20

## 2022-04-25 RX ORDER — LEVOCETIRIZINE DIHYDROCHLORIDE 5 MG/1
5 TABLET, FILM COATED ORAL EVERY EVENING
Qty: 30 TABLET | Refills: 0 | Status: SHIPPED | OUTPATIENT
Start: 2022-04-25

## 2022-04-25 RX ORDER — AZITHROMYCIN 250 MG/1
TABLET, FILM COATED ORAL
Qty: 6 TABLET | Refills: 0 | Status: SHIPPED | OUTPATIENT
Start: 2022-04-25 | End: 2022-04-30

## 2022-05-17 RX ORDER — FLUTICASONE PROPIONATE 50 MCG
SPRAY, SUSPENSION (ML) NASAL
Qty: 16 ML | Refills: 1 | OUTPATIENT
Start: 2022-05-17

## 2022-05-17 RX ORDER — LEVOCETIRIZINE DIHYDROCHLORIDE 5 MG/1
TABLET, FILM COATED ORAL
Qty: 30 TABLET | Refills: 0 | OUTPATIENT
Start: 2022-05-17

## 2022-05-27 ENCOUNTER — LAB ENCOUNTER (OUTPATIENT)
Dept: LAB | Age: 67
End: 2022-05-27
Attending: FAMILY MEDICINE
Payer: MEDICARE

## 2022-05-27 DIAGNOSIS — E78.2 MIXED HYPERLIPIDEMIA: Primary | ICD-10-CM

## 2022-05-27 LAB
CHOLEST SERPL-MCNC: 162 MG/DL (ref ?–200)
FASTING PATIENT LIPID ANSWER: YES
HDLC SERPL-MCNC: 39 MG/DL (ref 40–59)
LDLC SERPL CALC-MCNC: 92 MG/DL (ref ?–100)
NONHDLC SERPL-MCNC: 123 MG/DL (ref ?–130)
TRIGL SERPL-MCNC: 179 MG/DL (ref 30–149)
VLDLC SERPL CALC-MCNC: 29 MG/DL (ref 0–30)

## 2022-05-27 PROCEDURE — 80053 COMPREHEN METABOLIC PANEL: CPT | Performed by: FAMILY MEDICINE

## 2022-05-27 PROCEDURE — 84443 ASSAY THYROID STIM HORMONE: CPT | Performed by: FAMILY MEDICINE

## 2022-05-27 PROCEDURE — 82607 VITAMIN B-12: CPT | Performed by: FAMILY MEDICINE

## 2022-05-27 PROCEDURE — 36415 COLL VENOUS BLD VENIPUNCTURE: CPT | Performed by: FAMILY MEDICINE

## 2022-05-27 PROCEDURE — 80061 LIPID PANEL: CPT

## 2022-05-27 PROCEDURE — 85025 COMPLETE CBC W/AUTO DIFF WBC: CPT | Performed by: FAMILY MEDICINE

## 2022-05-27 PROCEDURE — 82306 VITAMIN D 25 HYDROXY: CPT | Performed by: FAMILY MEDICINE

## 2022-05-31 ENCOUNTER — OFFICE VISIT (OUTPATIENT)
Dept: FAMILY MEDICINE CLINIC | Facility: CLINIC | Age: 67
End: 2022-05-31
Payer: COMMERCIAL

## 2022-05-31 VITALS
DIASTOLIC BLOOD PRESSURE: 76 MMHG | HEIGHT: 65 IN | TEMPERATURE: 99 F | SYSTOLIC BLOOD PRESSURE: 122 MMHG | WEIGHT: 159.81 LBS | HEART RATE: 65 BPM | BODY MASS INDEX: 26.63 KG/M2

## 2022-05-31 DIAGNOSIS — N18.30 STAGE 3 CHRONIC KIDNEY DISEASE, UNSPECIFIED WHETHER STAGE 3A OR 3B CKD (HCC): Chronic | ICD-10-CM

## 2022-05-31 DIAGNOSIS — Z85.828 PERSONAL HISTORY OF SKIN CANCER: ICD-10-CM

## 2022-05-31 DIAGNOSIS — E03.8 OTHER SPECIFIED HYPOTHYROIDISM: ICD-10-CM

## 2022-05-31 DIAGNOSIS — Z00.00 ENCOUNTER FOR ANNUAL HEALTH EXAMINATION: Primary | ICD-10-CM

## 2022-05-31 DIAGNOSIS — E78.2 MIXED HYPERLIPIDEMIA: ICD-10-CM

## 2022-05-31 PROCEDURE — 99397 PER PM REEVAL EST PAT 65+ YR: CPT | Performed by: FAMILY MEDICINE

## 2022-05-31 PROCEDURE — 96160 PT-FOCUSED HLTH RISK ASSMT: CPT | Performed by: FAMILY MEDICINE

## 2022-05-31 PROCEDURE — G0439 PPPS, SUBSEQ VISIT: HCPCS | Performed by: FAMILY MEDICINE

## 2022-05-31 PROCEDURE — 3078F DIAST BP <80 MM HG: CPT | Performed by: FAMILY MEDICINE

## 2022-05-31 PROCEDURE — 3074F SYST BP LT 130 MM HG: CPT | Performed by: FAMILY MEDICINE

## 2022-05-31 PROCEDURE — 3008F BODY MASS INDEX DOCD: CPT | Performed by: FAMILY MEDICINE

## 2022-06-07 ENCOUNTER — NURSE ONLY (OUTPATIENT)
Dept: GASTROENTEROLOGY | Facility: CLINIC | Age: 67
End: 2022-06-07

## 2022-06-07 ENCOUNTER — TELEPHONE (OUTPATIENT)
Dept: GASTROENTEROLOGY | Facility: CLINIC | Age: 67
End: 2022-06-07

## 2022-06-07 DIAGNOSIS — Z86.010 HX OF COLONIC POLYPS: Primary | ICD-10-CM

## 2022-06-07 RX ORDER — POLYETHYLENE GLYCOL 3350, SODIUM CHLORIDE, SODIUM BICARBONATE, POTASSIUM CHLORIDE 420; 11.2; 5.72; 1.48 G/4L; G/4L; G/4L; G/4L
POWDER, FOR SOLUTION ORAL
Qty: 4000 ML | Refills: 0 | Status: SHIPPED | OUTPATIENT
Start: 2022-06-07

## 2022-06-07 NOTE — PROGRESS NOTES
Mariana Worley patient for a scheduled telephone colon screening. GI MD preference: none   Insurance:  [facility]   CBC from: 5/27/2022 show no signs of anemia   PCP visit on: 5/31/2022    Last Procedure, Date, MD:  Vitaliy Pearson 5/17/2022 Haroon loaiza w/Dr. Dwayne Arguelles   Last Diagnosis:  Diverticulosis and polyps   Recalled for (mth/yrs): 5 yrs   Sedation used previously:  MAC  Last Prep Used (if known):  TRILYTE  Quality of prep (if known): excellent   Anticoagulants: no   Diabetic Meds:  No   BP meds(Ace inhibitors/ARB's): LISINOPRIL am dose   Weight loss meds (phentermine/vyvanse): no  Iron supplement (RX/OTC): no  Height & Weight/BMI: 5'5\"/159lbs/26  Hx of Cardiac/CVA issues/(MI/Stroke): no   Devices Pacemaker/Defibrillator/Stents: no  Resp. Issues/Oxygen Use/ORTIZ/COPD: no   Issues w/Anesthesia: vomiting     Symptoms (Y/N): no   Symptoms Details:     Special comments/notes:    Family history of colon cancer: no     Medications, pharmacy, and allergies verified with patient over the phone. Please advise on orders and prep, thank you.

## 2022-06-07 NOTE — PROGRESS NOTES
The patient's chart has been reviewed. Okay to schedule pt for 5 year CLN recall r/t hx colon polyps with first available MD.     Advise IV Twilight or MAC sedation with split dose Colyte/TriLyte or equivalent(eRx) preparation.   -Eligible for NE: Yes r/t BMI < 40  -Denies history of bleeding/clotting disorders.      -Please note: It is the patient's responsibility to contact his/her insurance company regarding questions about out-of-pocket cost/benefits. Please provide the appropriate diagnostic information/codes.      May continue all medications listed in the med module except:  -Anti-platelets and anti-coagulants: ASA - continue as prescribed  -Diabetes meds: none    ** If MAC:    - HOLD lisinopril the night before and/or the day of the procedure(s)     - NO alcohol, recreational drugs nor erectile dysfunction medications 24 hours before procedure(s)   - NO herbal supplements or weight loss medications (phentermine/Vyvanse/Adderall) x 7 days prior to the procedure(s)    ** If MAC @ [facility] or IV twilight - continue all medications as prescribed    ** COVID-19 testing required 72 hours prior to procedure    **Patient to follow-up with any new medical history/medications prior to procedure**

## 2022-06-07 NOTE — TELEPHONE ENCOUNTER
Per referral entered for colonoscopy 07/05/2022--    \"No PA needed per [facility] online\". Contacted Nneka to confirm above information. Left detailed message explaining. Plan to await call back if further clarification needed.

## 2022-06-07 NOTE — PROGRESS NOTES
Scheduled for:  Colonoscopy [id number]    Provider Name:  Dr. Holli Banegas  Date:  7/5/2022  Location:  [facility]  Sedation:  MAC  Time:  11:30 am Patient made aware [facility] will call the day before with an arrival time. Prep:  Split dose trilyte E-Prescribing Status: Receipt confirmed by pharmacy (6/7/2022 11:08 AM CDT)  Meds/Allergies Reconciled?:  Corinne/APN reviewed. Diagnosis with codes:  Colon polyps Z86.010  Was patient informed to call insurance with codes (Y/N):  I confirmed [facility]  insurance with this patient. Referral sent?:  Referral was sent. [facility] or [address] notified?:  Electronic case request was sent to [facility] OF THE [facility] via [employer]. Medication Orders: HOLD LISINOPRIL THE MORNING OF PROCEDURE     Misc Orders:  Patient was informed that they will need a COVID 19 test prior to their procedure. Patient verbally understood & will await a phone call from [facility] to schedule. Further instructions given by staff:  I discussed the prep instructions with the patient which she verbally understood and is aware that I will send the instructions today via [address]

## 2022-06-14 ENCOUNTER — TELEPHONE (OUTPATIENT)
Dept: GASTROENTEROLOGY | Facility: CLINIC | Age: 67
End: 2022-06-14

## 2022-08-09 ENCOUNTER — SURGERY CENTER DOCUMENTATION (OUTPATIENT)
Dept: SURGERY | Age: 67
End: 2022-08-09

## 2022-08-09 PROCEDURE — 45380 COLONOSCOPY AND BIOPSY: CPT | Performed by: INTERNAL MEDICINE

## 2022-08-12 ENCOUNTER — TELEPHONE (OUTPATIENT)
Dept: GASTROENTEROLOGY | Facility: CLINIC | Age: 67
End: 2022-08-12

## 2022-08-12 NOTE — TELEPHONE ENCOUNTER
Dr. Evens Omer--    Attached colonoscopy results performed 8/9/22 below. Please advise on pathology findings and recommendations. Thank you! Sent [facility] pathology report to scan.

## 2022-08-22 NOTE — TELEPHONE ENCOUNTER
Created: 8/22/2022 4:16 PM        *-*-*This message has not been handled. *-*-*    Are there any pathology results yet?   Mary Harris

## 2022-08-23 ENCOUNTER — PATIENT MESSAGE (OUTPATIENT)
Dept: GASTROENTEROLOGY | Facility: CLINIC | Age: 67
End: 2022-08-23

## 2022-08-23 NOTE — TELEPHONE ENCOUNTER
GI staff: please place recall for colonoscopy in 3  years Include Location In Plan?: No Detail Level: Zone

## 2022-08-23 NOTE — TELEPHONE ENCOUNTER
Entered into Epic. Recall CLN in 3 years per Dr. Tyler Islas. Last CLN done 08/09/2022. Recall entered into Patient Outreach for 08/09/2025. Health Maintenance updated.

## 2022-08-23 NOTE — TELEPHONE ENCOUNTER
From: Xiao Britt  To: Khushbu Merlos MD  Sent: 8/23/2022 7:55 AM CDT  Subject: pathology report from 8-9-22 colonoscopy    Report shows \" no component information\" what does that mean?     Erin Ralph

## 2022-09-08 RX ORDER — LEVOTHYROXINE SODIUM 112 UG/1
112 TABLET ORAL
Qty: 90 TABLET | Refills: 1 | Status: SHIPPED | OUTPATIENT
Start: 2022-09-08 | End: 2023-03-08

## 2022-09-08 NOTE — TELEPHONE ENCOUNTER
Refill passed per [facility] protocol. Requested Prescriptions   Pending Prescriptions Disp Refills    LEVOTHYROXINE 112 MCG Oral Tab [Pharmacy Med Name: LEVOTHYROXINE 112 MCG TABLET] 90 tablet 1     Sig: TAKE 1 TABLET BY MOUTH BEFORE BREAKFAST.         Thyroid Medication Protocol Passed - 9/8/2022  4:15 AM        Passed - TSH in past 12 months        Passed - Last TSH value is normal     Lab Results   Component Value Date    TSH 1.590 05/27/2022                 Passed - In person appointment or virtual visit in the past 12 mos or appointment in next 3 mos       Recent Outpatient Visits              3 months ago Hx of colonic polyps    [facility]    Nurse Only    3 months ago Encounter for annual health examination    [place] [facility], Essie Becerril MD    Office Visit    4 months ago Sore throat    [place] [facility] [place], Essie Becerril MD    Office Visit    6 months ago Acute frontal sinusitis, recurrence not specified    [facility], [address], [address]    9 months ago Neoplasm of uncertain behavior of skin    [facility] Cyndi Cooney MD    Office Visit                       Recent Outpatient Visits              3 months ago Hx of colonic polyps    [facility]    Nurse Only    3 months ago Encounter for annual health examination    Essie Breen MD    Office Visit    4 months ago Sore throat    [facility], Essie Becerril MD    Office Visit    6 months ago Acute frontal sinusitis, recurrence not specified    [place] [facility], [address], [address]    9 months ago Neoplasm of uncertain behavior of skin    [facility] Cyndi Cooney MD    Office Visit

## 2022-09-15 RX ORDER — ATORVASTATIN CALCIUM 20 MG/1
20 TABLET, FILM COATED ORAL NIGHTLY
Qty: 90 TABLET | Refills: 1 | Status: SHIPPED | OUTPATIENT
Start: 2022-09-15

## 2022-09-15 NOTE — TELEPHONE ENCOUNTER
Refill passed per [address] protocol.   Requested Prescriptions   Pending Prescriptions Disp Refills    ATORVASTATIN 20 MG Oral Tab [Pharmacy Med Name: ATORVASTATIN 20 MG TABLET] 90 tablet 1     Sig: TAKE 1 TABLET BY MOUTH EVERY DAY AT NIGHT        Cholesterol Medication Protocol Passed - 9/15/2022 12:01 AM        Passed - ALT in past 12 months        Passed - LDL in past 12 months        Passed - Last ALT < 80       Lab Results   Component Value Date    ALT 26 05/27/2022             Passed - Last LDL < 130     Lab Results   Component Value Date    LDL 92 05/27/2022               Passed - In person appointment or virtual visit in the past 12 mos or appointment in next 3 mos       Recent Outpatient Visits              3 months ago Hx of colonic polyps    [facility]    Nurse Only    3 months ago Encounter for annual health examination    [address], Anita Wilhelm MD    Office Visit    4 months ago Sore throat    [address], Anita Wilhelm MD    Office Visit    6 months ago Acute frontal sinusitis, recurrence not specified    [address], [address], [address]    10 months ago Neoplasm of uncertain behavior of skin    [facility] Bentley Yip MD    Office Visit                     Recent Outpatient Visits              3 months ago Hx of colonic polyps    [facility]    Nurse Only    3 months ago Encounter for annual health examination    [address], Anita Wilhelm MD    Office Visit    4 months ago Sore throat    [address], Anita Wilhelm MD    Office Visit    6 months ago Acute frontal sinusitis, recurrence not specified    [address], [address], [address]    10 months ago Neoplasm of uncertain behavior of skin    [facility] Bentley Yip MD Office Visit

## 2022-11-08 ENCOUNTER — TELEPHONE (OUTPATIENT)
Dept: FAMILY MEDICINE CLINIC | Facility: CLINIC | Age: 67
End: 2022-11-08

## 2022-11-08 ENCOUNTER — ORDER TRANSCRIPTION (OUTPATIENT)
Dept: ADMINISTRATIVE | Facility: HOSPITAL | Age: 67
End: 2022-11-08

## 2022-11-08 DIAGNOSIS — Z12.31 ENCOUNTER FOR SCREENING MAMMOGRAM FOR MALIGNANT NEOPLASM OF BREAST: Primary | ICD-10-CM

## 2022-11-08 DIAGNOSIS — Z12.31 VISIT FOR SCREENING MAMMOGRAM: Primary | ICD-10-CM

## 2022-12-20 ENCOUNTER — HOSPITAL ENCOUNTER (OUTPATIENT)
Dept: MAMMOGRAPHY | Age: 67
Discharge: HOME | End: 2022-12-20
Attending: FAMILY MEDICINE
Payer: MEDICARE

## 2022-12-20 DIAGNOSIS — Z12.31 VISIT FOR SCREENING MAMMOGRAM: ICD-10-CM

## 2022-12-20 PROCEDURE — 77063 BREAST TOMOSYNTHESIS BI: CPT | Performed by: FAMILY MEDICINE

## 2022-12-20 PROCEDURE — 77067 SCR MAMMO BI INCL CAD: CPT | Performed by: FAMILY MEDICINE

## 2023-03-08 RX ORDER — LEVOTHYROXINE SODIUM 112 UG/1
TABLET ORAL
Qty: 90 TABLET | Refills: 3 | Status: SHIPPED | OUTPATIENT
Start: 2023-03-08

## 2023-05-09 NOTE — PROGRESS NOTES
Ramesh Early - Radiologist recommends repeat imaging in 6 months to assess for stability and no changes. It appears benign.  I have placed the orders. - Dr. Pierson Credit Was Levulan/Ameluz Applied On A Previous Day?: No

## 2023-06-15 ENCOUNTER — OFFICE VISIT (OUTPATIENT)
Dept: FAMILY MEDICINE CLINIC | Facility: CLINIC | Age: 68
End: 2023-06-15

## 2023-06-15 ENCOUNTER — LAB ENCOUNTER (OUTPATIENT)
Dept: LAB | Age: 68
End: 2023-06-15
Attending: FAMILY MEDICINE
Payer: MEDICARE

## 2023-06-15 VITALS
HEART RATE: 54 BPM | WEIGHT: 161.19 LBS | SYSTOLIC BLOOD PRESSURE: 129 MMHG | BODY MASS INDEX: 26.85 KG/M2 | DIASTOLIC BLOOD PRESSURE: 80 MMHG | HEIGHT: 65 IN

## 2023-06-15 DIAGNOSIS — E55.9 VITAMIN D DEFICIENCY: ICD-10-CM

## 2023-06-15 DIAGNOSIS — Z78.0 POST-MENOPAUSAL: ICD-10-CM

## 2023-06-15 DIAGNOSIS — Z85.828 PERSONAL HISTORY OF SKIN CANCER: ICD-10-CM

## 2023-06-15 DIAGNOSIS — E03.8 OTHER SPECIFIED HYPOTHYROIDISM: ICD-10-CM

## 2023-06-15 DIAGNOSIS — N18.30 STAGE 3 CHRONIC KIDNEY DISEASE, UNSPECIFIED WHETHER STAGE 3A OR 3B CKD (HCC): Chronic | ICD-10-CM

## 2023-06-15 DIAGNOSIS — Z00.00 ENCOUNTER FOR ANNUAL HEALTH EXAMINATION: ICD-10-CM

## 2023-06-15 DIAGNOSIS — E78.2 MIXED HYPERLIPIDEMIA: Primary | ICD-10-CM

## 2023-06-15 DIAGNOSIS — Z12.31 SCREENING MAMMOGRAM FOR BREAST CANCER: ICD-10-CM

## 2023-06-15 LAB
ALBUMIN SERPL-MCNC: 4.2 G/DL (ref 3.4–5)
ALBUMIN/GLOB SERPL: 1.2 {RATIO} (ref 1–2)
ALP LIVER SERPL-CCNC: 73 U/L
ALT SERPL-CCNC: 27 U/L
ANION GAP SERPL CALC-SCNC: 4 MMOL/L (ref 0–18)
AST SERPL-CCNC: 15 U/L (ref 15–37)
BASOPHILS # BLD AUTO: 0.02 X10(3) UL (ref 0–0.2)
BASOPHILS NFR BLD AUTO: 0.2 %
BILIRUB SERPL-MCNC: 0.8 MG/DL (ref 0.1–2)
BUN BLD-MCNC: 18 MG/DL (ref 7–18)
CALCIUM BLD-MCNC: 10 MG/DL (ref 8.5–10.1)
CHLORIDE SERPL-SCNC: 109 MMOL/L (ref 98–112)
CHOLEST SERPL-MCNC: 181 MG/DL (ref ?–200)
CO2 SERPL-SCNC: 29 MMOL/L (ref 21–32)
CREAT BLD-MCNC: 1.3 MG/DL
EOSINOPHIL # BLD AUTO: 0.17 X10(3) UL (ref 0–0.7)
EOSINOPHIL NFR BLD AUTO: 2.1 %
ERYTHROCYTE [DISTWIDTH] IN BLOOD BY AUTOMATED COUNT: 13 %
FASTING PATIENT LIPID ANSWER: YES
FASTING STATUS PATIENT QL REPORTED: YES
GFR SERPLBLD BASED ON 1.73 SQ M-ARVRAT: 44 ML/MIN/1.73M2 (ref 60–?)
GLOBULIN PLAS-MCNC: 3.5 G/DL (ref 2.8–4.4)
GLUCOSE BLD-MCNC: 105 MG/DL (ref 70–99)
HCT VFR BLD AUTO: 45.7 %
HDLC SERPL-MCNC: 41 MG/DL (ref 40–59)
HGB BLD-MCNC: 14.8 G/DL
IMM GRANULOCYTES # BLD AUTO: 0.03 X10(3) UL (ref 0–1)
IMM GRANULOCYTES NFR BLD: 0.4 %
LDLC SERPL CALC-MCNC: 110 MG/DL (ref ?–100)
LYMPHOCYTES # BLD AUTO: 2.1 X10(3) UL (ref 1–4)
LYMPHOCYTES NFR BLD AUTO: 25.5 %
MCH RBC QN AUTO: 29.9 PG (ref 26–34)
MCHC RBC AUTO-ENTMCNC: 32.4 G/DL (ref 31–37)
MCV RBC AUTO: 92.3 FL
MONOCYTES # BLD AUTO: 0.51 X10(3) UL (ref 0.1–1)
MONOCYTES NFR BLD AUTO: 6.2 %
NEUTROPHILS # BLD AUTO: 5.42 X10 (3) UL (ref 1.5–7.7)
NEUTROPHILS # BLD AUTO: 5.42 X10(3) UL (ref 1.5–7.7)
NEUTROPHILS NFR BLD AUTO: 65.6 %
NONHDLC SERPL-MCNC: 140 MG/DL (ref ?–130)
OSMOLALITY SERPL CALC.SUM OF ELEC: 296 MOSM/KG (ref 275–295)
PLATELET # BLD AUTO: 232 10(3)UL (ref 150–450)
POTASSIUM SERPL-SCNC: 4.9 MMOL/L (ref 3.5–5.1)
PROT SERPL-MCNC: 7.7 G/DL (ref 6.4–8.2)
RBC # BLD AUTO: 4.95 X10(6)UL
SODIUM SERPL-SCNC: 142 MMOL/L (ref 136–145)
T4 FREE SERPL-MCNC: 1.4 NG/DL (ref 0.8–1.7)
TRIGL SERPL-MCNC: 168 MG/DL (ref 30–149)
TSI SER-ACNC: 4.17 MIU/ML (ref 0.36–3.74)
VIT B12 SERPL-MCNC: 365 PG/ML (ref 193–986)
VIT D+METAB SERPL-MCNC: 33.4 NG/ML (ref 30–100)
VLDLC SERPL CALC-MCNC: 29 MG/DL (ref 0–30)
WBC # BLD AUTO: 8.3 X10(3) UL (ref 4–11)

## 2023-06-15 PROCEDURE — 84443 ASSAY THYROID STIM HORMONE: CPT | Performed by: FAMILY MEDICINE

## 2023-06-15 PROCEDURE — 80061 LIPID PANEL: CPT | Performed by: FAMILY MEDICINE

## 2023-06-15 PROCEDURE — 85025 COMPLETE CBC W/AUTO DIFF WBC: CPT | Performed by: FAMILY MEDICINE

## 2023-06-15 PROCEDURE — 82306 VITAMIN D 25 HYDROXY: CPT

## 2023-06-15 PROCEDURE — 80053 COMPREHEN METABOLIC PANEL: CPT | Performed by: FAMILY MEDICINE

## 2023-06-15 PROCEDURE — 82607 VITAMIN B-12: CPT | Performed by: FAMILY MEDICINE

## 2023-06-15 PROCEDURE — 84439 ASSAY OF FREE THYROXINE: CPT | Performed by: FAMILY MEDICINE

## 2023-06-15 PROCEDURE — 36415 COLL VENOUS BLD VENIPUNCTURE: CPT | Performed by: FAMILY MEDICINE

## 2023-06-15 RX ORDER — LISINOPRIL 5 MG/1
5 TABLET ORAL DAILY
Qty: 90 TABLET | Refills: 5 | Status: SHIPPED | OUTPATIENT
Start: 2023-06-15

## 2023-06-15 RX ORDER — LEVOTHYROXINE SODIUM 112 UG/1
112 TABLET ORAL
Qty: 90 TABLET | Refills: 4 | Status: SHIPPED | OUTPATIENT
Start: 2023-06-15

## 2023-06-15 RX ORDER — ATORVASTATIN CALCIUM 20 MG/1
20 TABLET, FILM COATED ORAL NIGHTLY
Qty: 90 TABLET | Refills: 4 | Status: SHIPPED | OUTPATIENT
Start: 2023-06-15

## 2023-06-15 RX ORDER — RIBOFLAVIN (VITAMIN B2) 100 MG
100 TABLET ORAL DAILY
COMMUNITY

## 2023-06-15 NOTE — PROGRESS NOTES
Ramesh Early - Normal Vitamin D levels. TSH level was a bit elevated this time - but normal free T4. No medication changes. Remember to take the levothyroxine first thing in the morning on an empty stomach with no other medications or food for 30 minutes. Kidney function is stable.  You should check back in with the nephrologist - Dr. Faustino Barnes if recommends any changes. - Dr. Conrad Nam

## 2023-08-05 ENCOUNTER — HOSPITAL ENCOUNTER (OUTPATIENT)
Dept: BONE DENSITY | Age: 68
Discharge: HOME | End: 2023-08-05
Attending: FAMILY MEDICINE
Payer: MEDICARE

## 2023-08-05 DIAGNOSIS — Z78.0 POST-MENOPAUSAL: ICD-10-CM

## 2023-08-05 PROCEDURE — 77080 DXA BONE DENSITY AXIAL: CPT | Performed by: FAMILY MEDICINE

## 2023-08-14 NOTE — PROGRESS NOTES
Continued mild bone loss call osteopenia. Not at level of osteoporosis - will continue to monitor every 2 years.  Keep up with weight bearing exercise like walking, aerobics and light weight training. - Dr. Mee Winn

## 2023-09-27 ENCOUNTER — OFFICE VISIT (OUTPATIENT)
Dept: FAMILY MEDICINE CLINIC | Facility: CLINIC | Age: 68
End: 2023-09-27

## 2023-09-27 VITALS
HEART RATE: 67 BPM | BODY MASS INDEX: 26.93 KG/M2 | SYSTOLIC BLOOD PRESSURE: 131 MMHG | HEIGHT: 65 IN | WEIGHT: 161.63 LBS | DIASTOLIC BLOOD PRESSURE: 82 MMHG

## 2023-09-27 DIAGNOSIS — R05.9 COUGH, UNSPECIFIED TYPE: Primary | ICD-10-CM

## 2023-09-27 PROCEDURE — 3075F SYST BP GE 130 - 139MM HG: CPT | Performed by: FAMILY MEDICINE

## 2023-09-27 PROCEDURE — 99213 OFFICE O/P EST LOW 20 MIN: CPT | Performed by: FAMILY MEDICINE

## 2023-09-27 PROCEDURE — 3079F DIAST BP 80-89 MM HG: CPT | Performed by: FAMILY MEDICINE

## 2023-09-27 PROCEDURE — 1126F AMNT PAIN NOTED NONE PRSNT: CPT | Performed by: FAMILY MEDICINE

## 2023-09-27 PROCEDURE — 1159F MED LIST DOCD IN RCRD: CPT | Performed by: FAMILY MEDICINE

## 2023-09-27 PROCEDURE — 3008F BODY MASS INDEX DOCD: CPT | Performed by: FAMILY MEDICINE

## 2023-09-27 RX ORDER — FLUTICASONE PROPIONATE 50 MCG
2 SPRAY, SUSPENSION (ML) NASAL DAILY
Qty: 16 G | Refills: 1 | Status: SHIPPED | OUTPATIENT
Start: 2023-09-27

## 2023-09-27 RX ORDER — LEVOCETIRIZINE DIHYDROCHLORIDE 5 MG/1
5 TABLET, FILM COATED ORAL EVERY EVENING
Qty: 30 TABLET | Refills: 0 | Status: SHIPPED | OUTPATIENT
Start: 2023-09-27

## 2023-10-20 RX ORDER — LEVOCETIRIZINE DIHYDROCHLORIDE 5 MG/1
5 TABLET, FILM COATED ORAL EVERY EVENING
Qty: 90 TABLET | Refills: 0 | Status: SHIPPED | OUTPATIENT
Start: 2023-10-20

## 2023-10-20 NOTE — TELEPHONE ENCOUNTER
Per pharmacy pt is requesting a 90 day supply for the following medication.       levocetirizine 5 MG Oral Tab, Take 1 tablet (5 mg total) by mouth every evening., Disp: 30 tablet, Rfl: 0

## 2023-10-20 NOTE — TELEPHONE ENCOUNTER
Refill passed per [facility] protocol. Requested Prescriptions   Pending Prescriptions Disp Refills    levocetirizine 5 MG Oral Tab 30 tablet 0     Sig: Take 1 tablet (5 mg total) by mouth every evening.        Allergy Medication Protocol Passed - 10/20/2023  1:54 PM        Passed - In person appointment or virtual visit in the past 12 mos or appointment in next 3 mos     Recent Outpatient Visits              3 weeks ago Cough, unspecified type    Abrahan Mock MD    Office Visit    4 months ago Mixed hyperlipidemia    Mere Mock Atha Gulling, MD    Office Visit    1 year ago Hx of colonic [address], [address] [facility] [address], [place]    Nurse Only    1 year ago Encounter for annual health examination    Mere Mock Atha Gulling, MD    Office Visit    1 year ago Sore throat    Mere Mock Atha Gulling, MD    Office Visit          Future Appointments         Provider Department Appt Notes    In 1 month MD Edita Renner Ashleyberg full skin check    In 2 months [facility]; [facility] [address] Outpatient Visits              3 weeks ago Cough, unspecified type    Abrahan Mock MD    Office Visit    4 months ago Mixed hyperlipidemia    Abrahan Mock MD    Office Visit    1 year ago Hx of colonic [address] [facility], [place]    Nurse Only    1 year ago Encounter for annual health examination    Abrahan Mock MD    Office Visit    1 year ago Sore throat    [address], Anamaria Rodriguez MD    Office Visit            Future Appointments         Provider Department Appt Notes    In 1 month MD Wing Nikolay Caldera Ashleyberg full skin check    In 2 months [facility]; [facility] [address] [facility]

## 2023-10-24 ENCOUNTER — TELEPHONE (OUTPATIENT)
Dept: FAMILY MEDICINE CLINIC | Facility: CLINIC | Age: 68
End: 2023-10-24

## 2023-10-24 NOTE — TELEPHONE ENCOUNTER
Pharmacy requesting refill     fluticasone propionate 50 MCG/ACT Nasal Suspension 2 sprays by Each Nare route daily.  16 g 1

## 2023-12-04 ENCOUNTER — OFFICE VISIT (OUTPATIENT)
Dept: DERMATOLOGY CLINIC | Facility: CLINIC | Age: 68
End: 2023-12-04
Payer: MEDICARE

## 2023-12-04 DIAGNOSIS — Z85.828 HISTORY OF NONMELANOMA SKIN CANCER: ICD-10-CM

## 2023-12-04 DIAGNOSIS — L82.1 SK (SEBORRHEIC KERATOSIS): ICD-10-CM

## 2023-12-04 DIAGNOSIS — D22.9 MULTIPLE NEVI: ICD-10-CM

## 2023-12-04 DIAGNOSIS — D23.9 BENIGN NEOPLASM OF SKIN, UNSPECIFIED LOCATION: ICD-10-CM

## 2023-12-04 DIAGNOSIS — L81.4 LENTIGO: ICD-10-CM

## 2023-12-04 DIAGNOSIS — D48.5 NEOPLASM OF UNCERTAIN BEHAVIOR OF SKIN: Primary | ICD-10-CM

## 2023-12-04 PROCEDURE — 11102 TANGNTL BX SKIN SINGLE LES: CPT | Performed by: DERMATOLOGY

## 2023-12-04 PROCEDURE — 1160F RVW MEDS BY RX/DR IN RCRD: CPT | Performed by: DERMATOLOGY

## 2023-12-04 PROCEDURE — 99214 OFFICE O/P EST MOD 30 MIN: CPT | Performed by: DERMATOLOGY

## 2023-12-04 PROCEDURE — 1126F AMNT PAIN NOTED NONE PRSNT: CPT | Performed by: DERMATOLOGY

## 2023-12-04 PROCEDURE — 1159F MED LIST DOCD IN RCRD: CPT | Performed by: DERMATOLOGY

## 2023-12-08 ENCOUNTER — TELEPHONE (OUTPATIENT)
Dept: DERMATOLOGY CLINIC | Facility: CLINIC | Age: 68
End: 2023-12-08

## 2023-12-08 NOTE — PROGRESS NOTES
Added to [facility]

## 2023-12-08 NOTE — PROGRESS NOTES
The pathology report from last visit showed pigmented BCC from right central chest this is very small appropriate to either treat with imiquimod twice a week for 6 weeks-my recommendation in this area for a cancer the size and type or if patient would prefer to have surgery on this please let me know. Please log in test results. Please call patient and inform of results and recommendations.  (please add to history). Pt to  rtc   for skin check in 4 to 6 months or prn.

## 2023-12-08 NOTE — PROGRESS NOTES
Patient informed of test results and all KMT's recommendations. Voiced understanding. Thank you for providing your recommendations, yes, pt would like the cream.  Reviewed application, side effects in detail.

## 2023-12-11 NOTE — PROGRESS NOTES
Operative Report                     Shave/  Tangential biopsy     Clinical diagnosis:    Size of lesion:    Location: Pt with new pearly papule with central darker pigment 3x4mm right central chest r/o pigmented bcc,     Procedure: With patient in appropriate position the skin of the above was scrubbed with alcohol. Anesthesia was obtained with 1% Xylocaine with epinephrine. The skin surrounding the lesion was placed under tension and the lesion was incised using a #15 scalpel blade. The specimen was sent for histopathologic exam.    Hemostasis was obtained with electrocautery/aluminum chloride. Estimated blood loss less than 2 cc. Biopsy dressed with Polysporin, bandage. Pressure dressing:   No    Complications: None    Written instructions given and reviewed with patient    Await pathology    Contact information reviewed.     Procedural physician:  Balaji Fletcher MD

## 2023-12-11 NOTE — PROGRESS NOTES
Ruma Brewer is a 77year old female. HPI:     CC:    Chief Complaint   Patient presents with    Full Skin Exam     LOV 11.17.21. Pt presents for FBSE. Pt has hx of BCC. Concerned about a spot on left hand. Allergies:  Patient has no known allergies. HISTORY:    Past Medical History:   Diagnosis Date    BCC (basal cell carcinoma of skin) 2018    right prearuicular     BCC (basal cell carcinoma) 2023    right central chest    Disorder of thyroid     High cholesterol     Hyperlipidemia     Hypothyroid     PONV (postoperative nausea and vomiting)     Renal disorder     Skin cancer 2018    BCC right preauricular      Past Surgical History:   Procedure Laterality Date    ADJ TISS XFER HEAD,FAC,HAND 10.1-30 Right 12/04/2018    Wide excision BCC right cheek, flap reconstruction    COLONOSCOPY  5/17    HYSTERECTOMY  12/2013    OTHER SURGICAL HISTORY  1989    laparascopy    TONSILLECTOMY  1959      Family History   Problem Relation Age of Onset    Cancer Self         skin    Cancer Mother     Heart Disorder Mother     Diabetes Father     Cancer Father     Heart Disorder Father     Pancreatic Cancer Brother         68      Social History     Socioeconomic History    Marital status:    Tobacco Use    Smoking status: Never    Smokeless tobacco: Never   Vaping Use    Vaping Use: Never used   Substance and Sexual Activity    Alcohol use: Yes     Comment: 2-3x/week    Drug use: No   Other Topics Concern    Reaction to local anesthetic No    Pt has a pacemaker No    Pt has a defibrillator No        Current Outpatient Medications   Medication Sig Dispense Refill    levocetirizine 5 MG Oral Tab Take 1 tablet (5 mg total) by mouth every evening. 90 tablet 0    fluticasone propionate 50 MCG/ACT Nasal Suspension 2 sprays by Each Nare route daily. 16 g 1    Ascorbic Acid (VITAMIN C) 100 MG Oral Tab Take 1 tablet (100 mg total) by mouth daily.       atorvastatin 20 MG Oral Tab Take 1 tablet (20 mg total) by mouth nightly. 90 tablet 4    levothyroxine 112 MCG Oral Tab Take 1 tablet (112 mcg total) by mouth before breakfast. 90 tablet 4    lisinopril 5 MG Oral Tab Take 1 tablet (5 mg total) by mouth daily. 90 tablet 5    Omega-3-acid Ethyl Esters 1 g Oral Cap Take 1 capsule (1 g total) by mouth 2 (two) times daily. Imiquimod 5 % External Cream Apply topically 2 times every week to affected area(s).  12 each 1     Allergies:   No Known Allergies    Past Medical History:   Diagnosis Date    BCC (basal cell carcinoma of skin) 2018    right prearuicular     BCC (basal cell carcinoma) 2023    right central chest    Disorder of thyroid     High cholesterol     Hyperlipidemia     Hypothyroid     PONV (postoperative nausea and vomiting)     Renal disorder     Skin cancer 2018    BCC right preauricular     Past Surgical History:   Procedure Laterality Date    ADJ TISS XFER HEAD,FAC,HAND 10.1-30 Right 12/04/2018    Wide excision BCC right cheek, flap reconstruction    COLONOSCOPY  5/17    HYSTERECTOMY  12/2013    OTHER SURGICAL HISTORY  1989    laparascopy    TONSILLECTOMY  1959     Social History     Socioeconomic History    Marital status:      Spouse name: Not on file    Number of children: Not on file    Years of education: Not on file    Highest education level: Not on file   Occupational History    Not on file   Tobacco Use    Smoking status: Never    Smokeless tobacco: Never   Vaping Use    Vaping Use: Never used   Substance and Sexual Activity    Alcohol use: Yes     Comment: 2-3x/week    Drug use: No    Sexual activity: Not on file   Other Topics Concern    Grew up on a farm Not Asked    History of tanning Not Asked    Outdoor occupation Not Asked    Breast feeding Not Asked    Reaction to local anesthetic No    Left Handed Not Asked    Right Handed Not Asked    Currently spends a great deal of time in the sun Not Asked    Past Sunlamp Treatments for Acne Not Asked    Hx of Spending [place] Time in [place] Not Asked    Bad sunburns in the past Not Asked    Tanning Salons in the Past Not Asked    Hx of Radiation Treatments Not Asked    Regular use of sun block Not Asked    Pt has a pacemaker No    Pt has a defibrillator No   Social History Narrative    Not on file     Social Determinants of Health     Financial Resource Strain: Not on file   Food Insecurity: Not on file   Transportation Needs: Not on file   Physical Activity: Not on file   Stress: Not on file   Social Connections: Not on file   Housing Stability: Not on file     Family History   Problem Relation Age of Onset    Cancer Self         skin    Cancer Mother     Heart Disorder Mother     Diabetes Father     Cancer Father     Heart Disorder Father     Pancreatic Cancer Brother         68       There were no vitals filed for this visit. HPI:    Chief Complaint   Patient presents with    Full Skin Exam     LOV 11.17.21. Pt presents for FBSE. Pt has hx of BCC. Concerned about a spot on left hand. Watches grandchildren 6 and 6  Patient with history of [address] right preauricular 2018    Past notes/ records and appropriate/relevant lab results including pathology and past body maps reviewed. Including outside notes/ PCP notes as appropriate. Updated and new information noted in current visit. Patient presents with concerns above. Patient has been in their usual state of health. History, medications, allergies reviewed as noted. ROS:  new relevant systemic complaints as noted       Physical Examination:     Well-developed well-nourished patient alert oriented in no acute distress. Exam total-body performed, including scalp, head, neck, face,nails, hair, external eyes, including conjunctival mucosa, eyelids, lips external ears, back, chest,/ breasts, axillae,  abdomen, arms, legs, palms.      Multiple light to medium brown, well marginated, uniformly pigmented, macules and papules 6 mm and less scattered on exam. pigmented lesions examined with dermoscopy benign-appearing patterns. Waxy tannish keratotic papules scattered, cherry-red vascular papules scattered. See map today's date for lesions noted . Otherwise remarkable for lesions as noted on map. See details of examination  See Assessment /Plan for additional history and physical exam also:    Assessment / plan:    Orders Placed This Encounter   Procedures    Specimen to Pathology, Tissue [[facility] Pt to [facility]]    TISSUE, SKIN  [employer] for this Visit:  Requested Prescriptions      No prescriptions requested or ordered in this encounter         Encounter Diagnoses   Name Primary? Neoplasm of uncertain behavior of skin Yes    SK (seborrheic keratosis)     Lentigo     Multiple nevi     Benign neoplasm of skin, unspecified location     History of nonmelanoma skin cancer        See details on map. Remarkable for:         Pt with new pearly papule with central darker pigment 3x4mm right central chest r/o pigmented bcc,   Shave/ tangential biopsy performed, operative note and consent in chart further plans pending pathology    Right medial lower lid irritated pigmented SK 11/21      Multiple lentigines, keratoses scattered as noted over the back chest arms face. Actinic Keratoses. Precancerous nature discussed. Sun protection, sunscreen/ blocks encouraged . Monitoring for new lesions. Sun damage additional recurrent and new actinic keratoses, skin cancers may occur in areas of prior actinic keratoses, related to past sun exposure to minimize current sun exposure. Sunscreen applied consistently regularly, reapplication and sun protection while driving recommended. Patient with history of skin cancer. No evidence of recurrence. No new skin cancer. Numerous benign lentigines keratoses   Waxy tan keratotic papules lesions in areas of concern as noted reassurance given. Benign nature discussed.   Possibility of cryo, alphahydroxy acids over-the-counter retinol's discussed. No other susupicious lesions on todays  exam.  Please refer to map for specific lesions. See additional diagnoses. Pros cons of various therapies, risks benefits discussed. Pathophysiology discussed with patient. Therapeutic options reviewed. See  Medications in grid. Instructions reviewed at length. Benign nevi, seborrheic  keratoses, cherry angiomas:  Reassurance regarding other benign skin lesions. Signs and symptoms of skin cancer, ABCDE's of melanoma discussed with patient. Sunscreen use, sun protection, self exams reviewed. Followup as noted RTC routine checkup 6 mos - one year or p.r.n. Encounter Times Including precharting, reviewing chart, prior notes obtaining history: 10 minutes, medical exam :10 minutes, notes on body map, plan, counseling 10minutes My total time spent caring for the patient on the day of the encounter: 30 minutes     The patient indicates understanding of these issues and agrees to the plan. The patient is asked to return as noted in follow-up/ above. This note was generated using Dragon voice recognition software. Please contact me regarding any confusion resulting from errors in recognition.

## 2023-12-22 ENCOUNTER — HOSPITAL ENCOUNTER (OUTPATIENT)
Dept: MAMMOGRAPHY | Age: 68
Discharge: HOME | End: 2023-12-22
Attending: FAMILY MEDICINE
Payer: MEDICARE

## 2023-12-22 DIAGNOSIS — Z12.31 SCREENING MAMMOGRAM FOR BREAST CANCER: ICD-10-CM

## 2023-12-22 PROCEDURE — 77067 SCR MAMMO BI INCL CAD: CPT | Performed by: FAMILY MEDICINE

## 2023-12-22 PROCEDURE — 77063 BREAST TOMOSYNTHESIS BI: CPT | Performed by: FAMILY MEDICINE

## 2024-01-16 NOTE — INTERVAL H&P NOTE
Pre-op Diagnosis: Basal cell carcinoma    The above referenced H&P was reviewed by Megha Dubose MD on 12/4/2018, the patient was examined and no significant changes have occurred in the patient's condition since the H&P was performed.   I discusse No

## 2024-02-19 RX ORDER — FLUTICASONE PROPIONATE 50 MCG
2 SPRAY, SUSPENSION (ML) NASAL DAILY
Qty: 48 G | Refills: 3 | Status: SHIPPED | OUTPATIENT
Start: 2024-02-19

## 2024-02-19 NOTE — TELEPHONE ENCOUNTER
Pt is requesting refill for the following medication        fluticasone propionate 50 MCG/ACT Nasal Suspension, 2 sprays by Each Nare route daily., Disp: 16 g, Rfl: 1

## 2024-02-20 NOTE — TELEPHONE ENCOUNTER
Refill passed per [facility] protocol.     Requested Prescriptions   Pending Prescriptions Disp Refills    fluticasone propionate 50 MCG/ACT Nasal Suspension 48 g 1     Si sprays by Each Nare route daily.       Allergy Medication Protocol Passed - 2024  4:50 PM        Passed - In person appointment or virtual visit in the past 12 mos or appointment in next 3 mos     Recent Outpatient Visits              2 months ago Neoplasm of uncertain behavior of skin    [facility]    [facility]    4 months ago [facility], unspecified [facility]    8 months ago [facility] hyperlipidemia    [facility]    [facility]    1 year ago Hx of colonic polyps    [facility]    Nurse Only    1 year ago Encounter for annual health examination    [facility]                                 Recent Outpatient Visits              2 months ago Neoplasm of uncertain behavior of skin    [facility]    Office Visit    4 months ago Cough, unspecified type    [facility]    [facility]    8 months ago Mixed hyperlipidemia    [facility]    [facility] Visit    1 year ago Hx of colonic polyps    [facility]    Nurse Only    1 year ago Encounter for annual health examination    [facility]    [facility]

## 2024-02-27 RX ORDER — LEVOCETIRIZINE DIHYDROCHLORIDE 5 MG/1
5 TABLET, FILM COATED ORAL EVERY EVENING
Qty: 90 TABLET | Refills: 3 | Status: SHIPPED | OUTPATIENT
Start: 2024-02-27

## 2024-02-27 NOTE — TELEPHONE ENCOUNTER
Refill passed per [facility] protocol.  Requested Prescriptions   Pending Prescriptions Disp Refills    LEVOCETIRIZINE 5 MG Oral Tab [Pharmacy Med Name: LEVOCETIRIZINE 5 MG TABLET] 90 tablet 0     Sig: TAKE 1 TABLET BY MOUTH EVERY DAY IN THE EVENING       Allergy Medication Protocol Passed - 2/26/2024 10:26 PM        Passed - In person appointment or virtual visit in the past 12 mos or appointment in next 3 mos     Recent Outpatient Visits              2 months ago [facility] behavior [facility]    5 months ago [facility], unspecified [facility]    8 months ago [facility] hyperlipidemia    [facility]    [facility]    1 year ago Hx of colonic polyps    [facility]    Nurse Only    1 year ago Encounter for annual health examination    [facility]                         Recent Outpatient Visits              2 months ago [facility]oplasm of uncertain behavior of skin    [facility]    5 months ago Cough, unspecified type    [facility]    [facility]    8 months ago Mixed hyperlipidemia    [facility]    [facility]    1 year ago Hx of colonic polyps    [facility]    Nurse Only    1 year ago Encounter for annual health examination    [facility]

## 2024-03-11 ENCOUNTER — HOSPITAL ENCOUNTER (OUTPATIENT)
Dept: GENERAL RADIOLOGY | Age: 69
Discharge: HOME | End: 2024-03-11
Attending: PODIATRIST
Payer: MEDICARE

## 2024-03-11 DIAGNOSIS — M72.2 PLANTAR FASCIITIS: ICD-10-CM

## 2024-03-11 DIAGNOSIS — M79.672 LEFT FOOT PAIN: ICD-10-CM

## 2024-03-11 PROCEDURE — 73630 X-RAY EXAM OF FOOT: CPT | Performed by: PODIATRIST

## 2024-06-25 ENCOUNTER — OFFICE VISIT (OUTPATIENT)
Dept: FAMILY MEDICINE CLINIC | Facility: CLINIC | Age: 69
End: 2024-06-25

## 2024-06-25 ENCOUNTER — LAB ENCOUNTER (OUTPATIENT)
Dept: LAB | Age: 69
End: 2024-06-25
Attending: FAMILY MEDICINE

## 2024-06-25 VITALS
HEIGHT: 65 IN | WEIGHT: 161.19 LBS | HEART RATE: 51 BPM | BODY MASS INDEX: 26.85 KG/M2 | DIASTOLIC BLOOD PRESSURE: 77 MMHG | SYSTOLIC BLOOD PRESSURE: 128 MMHG

## 2024-06-25 DIAGNOSIS — N18.32 STAGE 3B CHRONIC KIDNEY DISEASE (HCC): Primary | ICD-10-CM

## 2024-06-25 DIAGNOSIS — E03.8 OTHER SPECIFIED HYPOTHYROIDISM: ICD-10-CM

## 2024-06-25 DIAGNOSIS — Z00.00 ENCOUNTER FOR ANNUAL HEALTH EXAMINATION: ICD-10-CM

## 2024-06-25 DIAGNOSIS — M25.562 ACUTE PAIN OF LEFT KNEE: ICD-10-CM

## 2024-06-25 DIAGNOSIS — E78.2 MIXED HYPERLIPIDEMIA: ICD-10-CM

## 2024-06-25 DIAGNOSIS — E55.9 VITAMIN D DEFICIENCY: ICD-10-CM

## 2024-06-25 DIAGNOSIS — M25.551 RIGHT HIP PAIN: ICD-10-CM

## 2024-06-25 DIAGNOSIS — Z85.828 PERSONAL HISTORY OF SKIN CANCER: ICD-10-CM

## 2024-06-25 DIAGNOSIS — Z12.31 SCREENING MAMMOGRAM FOR BREAST CANCER: ICD-10-CM

## 2024-06-25 LAB
ALBUMIN SERPL-MCNC: 4.8 G/DL (ref 3.2–4.8)
ALBUMIN/GLOB SERPL: 1.6 {RATIO} (ref 1–2)
ALP LIVER SERPL-CCNC: 77 U/L
ALT SERPL-CCNC: 18 U/L
ANION GAP SERPL CALC-SCNC: 6 MMOL/L (ref 0–18)
AST SERPL-CCNC: 19 U/L (ref ?–34)
BASOPHILS # BLD AUTO: 0.04 X10(3) UL (ref 0–0.2)
BASOPHILS NFR BLD AUTO: 0.5 %
BILIRUB SERPL-MCNC: 0.7 MG/DL (ref 0.2–1.1)
BUN BLD-MCNC: 21 MG/DL (ref 9–23)
BUN/CREAT SERPL: 17.6 (ref 10–20)
CALCIUM BLD-MCNC: 10.6 MG/DL (ref 8.7–10.4)
CHLORIDE SERPL-SCNC: 109 MMOL/L (ref 98–112)
CHOLEST SERPL-MCNC: 178 MG/DL (ref ?–200)
CO2 SERPL-SCNC: 27 MMOL/L (ref 21–32)
CREAT BLD-MCNC: 1.19 MG/DL
DEPRECATED RDW RBC AUTO: 43.3 FL (ref 35.1–46.3)
EGFRCR SERPLBLD CKD-EPI 2021: 49 ML/MIN/1.73M2 (ref 60–?)
EOSINOPHIL # BLD AUTO: 0.14 X10(3) UL (ref 0–0.7)
EOSINOPHIL NFR BLD AUTO: 1.9 %
ERYTHROCYTE [DISTWIDTH] IN BLOOD BY AUTOMATED COUNT: 12.8 % (ref 11–15)
FASTING PATIENT LIPID ANSWER: NO
FASTING STATUS PATIENT QL REPORTED: NO
GLOBULIN PLAS-MCNC: 3 G/DL (ref 2–3.5)
GLUCOSE BLD-MCNC: 96 MG/DL (ref 70–99)
HCT VFR BLD AUTO: 45.3 %
HDLC SERPL-MCNC: 40 MG/DL (ref 40–59)
HGB BLD-MCNC: 14.6 G/DL
IMM GRANULOCYTES # BLD AUTO: 0.02 X10(3) UL (ref 0–1)
IMM GRANULOCYTES NFR BLD: 0.3 %
LDLC SERPL CALC-MCNC: 107 MG/DL (ref ?–100)
LYMPHOCYTES # BLD AUTO: 2.15 X10(3) UL (ref 1–4)
LYMPHOCYTES NFR BLD AUTO: 29.5 %
MCH RBC QN AUTO: 29.8 PG (ref 26–34)
MCHC RBC AUTO-ENTMCNC: 32.2 G/DL (ref 31–37)
MCV RBC AUTO: 92.4 FL
MONOCYTES # BLD AUTO: 0.49 X10(3) UL (ref 0.1–1)
MONOCYTES NFR BLD AUTO: 6.7 %
NEUTROPHILS # BLD AUTO: 4.45 X10 (3) UL (ref 1.5–7.7)
NEUTROPHILS # BLD AUTO: 4.45 X10(3) UL (ref 1.5–7.7)
NEUTROPHILS NFR BLD AUTO: 61.1 %
NONHDLC SERPL-MCNC: 138 MG/DL (ref ?–130)
OSMOLALITY SERPL CALC.SUM OF ELEC: 297 MOSM/KG (ref 275–295)
PLATELET # BLD AUTO: 232 10(3)UL (ref 150–450)
POTASSIUM SERPL-SCNC: 4.8 MMOL/L (ref 3.5–5.1)
PROT SERPL-MCNC: 7.8 G/DL (ref 5.7–8.2)
RBC # BLD AUTO: 4.9 X10(6)UL
SODIUM SERPL-SCNC: 142 MMOL/L (ref 136–145)
TRIGL SERPL-MCNC: 176 MG/DL (ref 30–149)
TSI SER-ACNC: 0.98 MIU/ML (ref 0.55–4.78)
VIT B12 SERPL-MCNC: 391 PG/ML (ref 211–911)
VIT D+METAB SERPL-MCNC: 36.6 NG/ML (ref 30–100)
VLDLC SERPL CALC-MCNC: 30 MG/DL (ref 0–30)
WBC # BLD AUTO: 7.3 X10(3) UL (ref 4–11)

## 2024-06-25 PROCEDURE — 80053 COMPREHEN METABOLIC PANEL: CPT | Performed by: FAMILY MEDICINE

## 2024-06-25 PROCEDURE — 3008F BODY MASS INDEX DOCD: CPT | Performed by: FAMILY MEDICINE

## 2024-06-25 PROCEDURE — 96160 PT-FOCUSED HLTH RISK ASSMT: CPT | Performed by: FAMILY MEDICINE

## 2024-06-25 PROCEDURE — 99214 OFFICE O/P EST MOD 30 MIN: CPT | Performed by: FAMILY MEDICINE

## 2024-06-25 PROCEDURE — 84443 ASSAY THYROID STIM HORMONE: CPT | Performed by: FAMILY MEDICINE

## 2024-06-25 PROCEDURE — 82607 VITAMIN B-12: CPT | Performed by: FAMILY MEDICINE

## 2024-06-25 PROCEDURE — 80061 LIPID PANEL: CPT | Performed by: FAMILY MEDICINE

## 2024-06-25 PROCEDURE — 82306 VITAMIN D 25 HYDROXY: CPT

## 2024-06-25 PROCEDURE — G0439 PPPS, SUBSEQ VISIT: HCPCS | Performed by: FAMILY MEDICINE

## 2024-06-25 PROCEDURE — 3074F SYST BP LT 130 MM HG: CPT | Performed by: FAMILY MEDICINE

## 2024-06-25 PROCEDURE — 85025 COMPLETE CBC W/AUTO DIFF WBC: CPT | Performed by: FAMILY MEDICINE

## 2024-06-25 PROCEDURE — 3078F DIAST BP <80 MM HG: CPT | Performed by: FAMILY MEDICINE

## 2024-06-25 PROCEDURE — 36415 COLL VENOUS BLD VENIPUNCTURE: CPT | Performed by: FAMILY MEDICINE

## 2024-06-25 RX ORDER — ATORVASTATIN CALCIUM 20 MG/1
20 TABLET, FILM COATED ORAL NIGHTLY
Qty: 90 TABLET | Refills: 4 | Status: SHIPPED | OUTPATIENT
Start: 2024-06-25

## 2024-06-25 RX ORDER — LISINOPRIL 5 MG/1
5 TABLET ORAL DAILY
Qty: 90 TABLET | Refills: 5 | Status: SHIPPED | OUTPATIENT
Start: 2024-06-25

## 2024-06-25 RX ORDER — LEVOTHYROXINE SODIUM 112 UG/1
112 TABLET ORAL
Qty: 90 TABLET | Refills: 4 | Status: SHIPPED | OUTPATIENT
Start: 2024-06-25

## 2024-06-25 NOTE — PROGRESS NOTES
Subjective:   Nneka Britt is a 69 year old female who presents for a Medicare Subsequent Annual Wellness visit (Pt already had Initial Annual Wellness) and scheduled follow up of multiple significant but stable problems.   Walking. Watching 6 grandkids - not all at once. Some pain - right hip. Left knee and left shoulder.     History/Other:   Fall Risk Assessment:   She has been screened for Falls and is low risk.      Cognitive Assessment:   She had a completely normal cognitive assessment - see flowsheet entries     Functional Ability/Status:   Nneka Britt has a completely normal functional assessment. See flowsheet for details.      Depression Screening (PHQ-2/PHQ-9): PHQ-2 SCORE: 0  , done 6/18/2024          Advanced Directives:   She does have a Living Will but we do NOT have it on file in Epic.    She does have a POA but we do NOT have it on file in Epic.    Discussed Advance Care Planning with patient (and family/surrogate if present). Standard forms made available to patient in After Visit Summary.      Patient Active Problem List   Diagnosis    Mixed hyperlipidemia    Other specified hypothyroidism    Stage 3b chronic kidney disease (HCC)    Personal history of skin cancer     Allergies:  She has No Known Allergies.    Current Medications:  Outpatient Medications Marked as Taking for the 6/25/24 encounter (Office Visit) with Alexandria Garnica MD   Medication Sig    levocetirizine 5 MG Oral Tab Take 1 tablet (5 mg total) by mouth every evening.    fluticasone propionate 50 MCG/ACT Nasal Suspension 2 sprays by Each Nare route daily.    Ascorbic Acid (VITAMIN C) 100 MG Oral Tab Take 1 tablet (100 mg total) by mouth daily.    atorvastatin 20 MG Oral Tab Take 1 tablet (20 mg total) by mouth nightly.    levothyroxine 112 MCG Oral Tab Take 1 tablet (112 mcg total) by mouth before breakfast.    lisinopril 5 MG Oral Tab Take 1 tablet (5 mg total) by mouth daily.    Omega-3-acid Ethyl Esters 1 g Oral Cap  Take 1 capsule (1 g total) by mouth 2 (two) times daily.       Medical History:  She  has a past medical history of BCC (basal cell carcinoma of skin) (), BCC (basal cell carcinoma) (), Disorder of thyroid, High cholesterol, Hyperlipidemia, Hypothyroid, PONV (postoperative nausea and vomiting), Renal disorder, and Skin cancer ().  Surgical History:  She  has a past surgical history that includes colonoscopy (2017); hysterectomy (2013); tonsillectomy (); other surgical history (); adj tiss xfer head,fac,hand 10.1-30 (Right, 2018); colonoscopy (); d & c (); and  (,,,).   Family History:  Her family history includes Cancer in her brother, father, mother, and self; Diabetes in her father; Heart Disorder in her father and mother; Pancreatic Cancer in her brother; Stroke in her father.  Social History:  She  reports that she has never smoked. She has never used smokeless tobacco. She reports current alcohol use of about 2.0 standard drinks of alcohol per week. She reports that she does not use drugs.    Tobacco:  She has never smoked tobacco.    CAGE Alcohol Screen:   CAGE screening score of 0 on 2024, showing low risk of alcohol abuse.      Patient Care Team:  Alexandria Garnica MD as PCP - General (Family Practice)  Rishabh Santiago MD (GASTROENTEROLOGY)    Review of Systems     Negative except see HPI     Objective:   Physical Exam  Constitutional:       Appearance: She is well-developed.   HENT:      Right Ear: Tympanic membrane normal.      Left Ear: Tympanic membrane normal.      Mouth/Throat:      Pharynx: Oropharynx is clear.   Cardiovascular:      Rate and Rhythm: Normal rate and regular rhythm.      Heart sounds: Normal heart sounds.   Pulmonary:      Effort: Pulmonary effort is normal.      Breath sounds: Normal breath sounds.   Abdominal:      General: Bowel sounds are normal.      Palpations: Abdomen is soft.   Musculoskeletal:       Comments: Normal hip rom bilateral - tenderness in left knee lateral joint line.    Skin:     General: Skin is warm and dry.   Neurological:      Mental Status: She is alert and oriented to person, place, and time.      Deep Tendon Reflexes: Reflexes are normal and symmetric.   Psychiatric:         Mood and Affect: Mood normal.         Behavior: Behavior normal.           /77   Pulse 51   Ht 5' 5\" (1.651 m)   Wt 161 lb 3.2 oz (73.1 kg)   BMI 26.83 kg/m²  Estimated body mass index is 26.83 kg/m² as calculated from the following:    Height as of this encounter: 5' 5\" (1.651 m).    Weight as of this encounter: 161 lb 3.2 oz (73.1 kg).    Medicare Hearing Assessment:   Hearing Screening    Time taken: 6/25/2024 11:07 AM  Screening Method: Questionnaire  I have a problem hearing over the telephone: No I have trouble following the conversations when two or more people are talking at the same time: No   I have trouble understanding things on the TV: No I have to strain to understand conversations: No   I have to worry about missing the telephone ring or doorbell: No I have trouble hearing conversations in a noisy background such as a crowded room or restaurant: No   I get confused about where sounds come from: No I misunderstand some words in a sentence and need to ask people to repeat themselves: No   I especially have trouble understanding the speech of women and children: No I have trouble understanding the speaker in a large room such as at a meeting or place of Hoahaoism: No   Many people I talk to seem to mumble (or don't speak clearly): Sometimes People get annoyed because I misunderstand what they say: No   I misunderstand what others are saying and make inappropriate responses: No I avoid social activities because I cannot hear well and fear I will reply improperly: No   Family members and friends have told me they think I may have hearing loss: No             Visual Acuity:   Right Eye Visual Acuity:  Uncorrected Right Eye Chart Acuity: 20/40   Left Eye Visual Acuity: Uncorrected Left Eye Chart Acuity: 20/40   Both Eyes Visual Acuity: Uncorrected Both Eyes Chart Acuity: 20/40            Assessment & Plan:   Nneka Britt is a 69 year old female who presents for a Medicare Assessment.     1. Stage 3b chronic kidney disease (HCC)  Due for labs. Last year within 3b range. Pending labs. Continue hydration and avoid ibuprofen   - CBC With Differential With Platelet  - Comp Metabolic Panel (14)  - Lipid Panel  - TSH W Reflex To Free T4    2. Mixed hyperlipidemia  Stable on statin  - Lipid Panel  - atorvastatin 20 MG Oral Tab; Take 1 tablet (20 mg total) by mouth nightly.  Dispense: 90 tablet; Refill: 4    3. Other specified hypothyroidism  Has been stable on levothyroxine.     4. Personal history of skin cancer      5. Vitamin D deficiency    - Vitamin B12  - Vitamin D; Future    6. Encounter for annual health examination    - CBC With Differential With Platelet  - Comp Metabolic Panel (14)  - Lipid Panel  - TSH W Reflex To Free T4  - Vitamin B12  - Vitamin D; Future  - [facility] JERROD 2D+3D SCREENING BILAT (CPT=77067/56891); Future    7. Screening mammogram for breast cancer    - [facility] JERROD 2D+3D SCREENING BILAT (CPT=77067/92322); Future    8. Right hip pain  Recommend PT and strength training.   - Physical Therapy Referral - [place] Locations    9. Acute pain of left knee  Recommend PT and strength training.   - Physical Therapy Referral - [place] Locations    The patient indicates understanding of these issues and agrees to the plan.  Reinforced healthy diet, lifestyle, and exercise.      No follow-ups on file.     Alexandria Garnica MD, 6/25/2024     Supplementary Documentation:   General Health:  In the past six months, have you lost more than 10 pounds without trying?: 2 - No  Has your appetite been poor?: No  Type of Diet: Balanced  How does the patient maintain a good energy level?: Appropriate Exercise;Daily  Walks;Stretching  How would you describe your daily physical activity?: Heavy  How would you describe your current health state?: Good  How do you maintain positive mental well-being?: Puzzles;Games;Visiting Friends;Visiting Family  On a scale of 0 to 10, with 0 being no pain and 10 being severe pain, what is your pain level?: 2 - (Mild)  In the past six months, have you experienced urine leakage?: 0-No  At any time do you feel concerned for the safety/well-being of yourself and/or your children, in your home or elsewhere?: No  Have you had any immunizations at another office such as Influenza, Hepatitis B, Tetanus, or Pneumococcal?: Yes       Nneka Britt's SCREENING SCHEDULE   Tests on this list are recommended by your physician but may not be covered, or covered at this frequency, by your insurer.   Please check with your insurance carrier before scheduling to verify coverage.   PREVENTATIVE SERVICES FREQUENCY &  COVERAGE DETAILS LAST COMPLETION DATE   Diabetes Screening    Fasting Blood Sugar /  Glucose    One screening every 12 months if never tested or if previously tested but not diagnosed with pre-diabetes   One screening every 6 months if diagnosed with pre-diabetes Lab Results   Component Value Date     (H) 06/15/2023        Cardiovascular Disease Screening    Lipid Panel  Cholesterol  Lipoprotein (HDL)  Triglycerides Covered every 5 years for all Medicare beneficiaries without apparent signs or symptoms of cardiovascular disease Lab Results   Component Value Date    CHOLEST 181 06/15/2023    HDL 41 06/15/2023     (H) 06/15/2023    TRIG 168 (H) 06/15/2023         Electrocardiogram (EKG)   Covered if needed at Welcome to Medicare, and non-screening if indicated for medical reasons 08/21/2018      Ultrasound Screening for Abdominal Aortic Aneurysm (AAA) Covered once in a lifetime for one of the following risk factors    Men who are 65-75 years old and have ever smoked    Anyone with a  family history -     Colorectal Cancer Screening  Covered for ages 50-85; only need ONE of the following:    Colonoscopy   Covered every 10 years    Covered every 2 years if patient is at high risk or previous colonoscopy was abnormal 08/09/2022    Health Maintenance   Topic Date Due    Colorectal Cancer Screening  08/09/2025       Flexible Sigmoidoscopy   Covered every 4 years -    Fecal Occult Blood Test Covered annually -   Bone Density Screening    Bone density screening    Covered every 2 years after age 65 if diagnosed with risk of osteoporosis or estrogen deficiency.    Covered yearly for long-term glucocorticoid medication use (Steroids) Last Dexa Scan:    XR DEXA BONE DENSITOMETRY (CPT=77080) 08/07/2023      No recommendations at this time   Pap and Pelvic    Pap   Covered every 2 years for women at normal risk; Annually if at high risk -  No recommendations at this time    Chlamydia Annually if high risk -  No recommendations at this time   Screening Mammogram    Mammogram     Recommend annually for all female patients aged 40 and older    One baseline mammogram covered for patients aged 35-39 12/22/2023    Health Maintenance   Topic Date Due    Mammogram  12/22/2024       Immunizations    Influenza Covered once per flu season  Please get every year 10/02/2023  No recommendations at this time    Pneumococcal Each vaccine ([id number] & [id number]) covered once after 65 Prevnar 13: 08/21/2018    [id number]: 09/13/2019     No recommendations at this time    Hepatitis B One screening covered for patients with certain risk factors   -  No recommendations at this time    Tetanus Toxoid Not covered by Medicare Part B unless medically necessary (cut with metal); may be covered with your pharmacy prescription benefits -    Tetanus, Diptheria and Pertusis TD and TDaP Not covered by Medicare Part B -  No recommendations at this time    Zoster Not covered by Medicare Part B; may be covered with your pharmacy   prescription benefits 11/03/2017  No recommendations at this time     Annual Monitoring of Persistent Medications (ACE/ARB, digoxin diuretics, anticonvulsants)    Potassium Annually Lab Results   Component Value Date    K 4.9 06/15/2023         Creatinine   Annually Lab Results   Component Value Date    CREATSERUM 1.30 (H) 06/15/2023         BUN Annually Lab Results   Component Value Date    BUN 18 06/15/2023       Drug Serum Conc Annually No results found for: \"DIGOXIN\", \"DIG\", \"VALP\"

## 2024-06-26 NOTE — PROGRESS NOTES
Labs look good. Improved kidney function. Continue staying well hydrated and avoid ibuprofen type products. Calcium was a bit elevated this time. If you are taking supplements, please stop for now. - Dr. Garnica

## 2024-07-08 ENCOUNTER — TELEPHONE (OUTPATIENT)
Dept: PHYSICAL THERAPY | Facility: HOSPITAL | Age: 69
End: 2024-07-08

## 2024-07-30 ENCOUNTER — APPOINTMENT (OUTPATIENT)
Dept: PHYSICAL THERAPY | Age: 69
End: 2024-07-30
Attending: FAMILY MEDICINE
Payer: MEDICARE

## 2024-08-06 ENCOUNTER — TELEPHONE (OUTPATIENT)
Dept: PHYSICAL THERAPY | Facility: HOSPITAL | Age: 69
End: 2024-08-06

## 2024-08-07 ENCOUNTER — OFFICE VISIT (OUTPATIENT)
Dept: PHYSICAL THERAPY | Age: 69
End: 2024-08-07
Attending: FAMILY MEDICINE
Payer: MEDICARE

## 2024-08-07 DIAGNOSIS — M25.562 ACUTE PAIN OF LEFT KNEE: ICD-10-CM

## 2024-08-07 DIAGNOSIS — M25.551 RIGHT HIP PAIN: Primary | ICD-10-CM

## 2024-08-07 PROCEDURE — 97110 THERAPEUTIC EXERCISES: CPT

## 2024-08-07 PROCEDURE — 97161 PT EVAL LOW COMPLEX 20 MIN: CPT

## 2024-08-07 NOTE — PROGRESS NOTES
RICARDOTJac EVALUATION:   Referring Physician: Dr. Garnica  Diagnosis: Right hip pain (M25.551)  Acute pain of left knee (M25.562)  Date of Onset: June 2023 Date of Service: 8/7/2024     PATIENT SUMMARY   Patient verbalized consent for Physical Therapy evaluation and treatment.  Nneka Britt is a 69 year old y/o female who presents to therapy today with complaints of right hip pain, left knee pain  Pt describes pain level 6-7/10   History of current condition: Patient reports pain began in the summer of 2023 with no mechanism of injury.  Patient reports pain is located in her low back and right hip region/gluteal and thigh region.  Pt also reports left suprapatellar knee region pain.  She reports no imaging.  Patient reports she also has a hx of left foot plantar fasciitis that developed within the last six months or so and numbness in her B toes.     Current functional limitations include bending, sitting, transfers, lifting, sleeping, ADLs, car transfers, stair negotiation (ascent), driving, ambulation  Nneka describes prior level of function independent without functional limitations  Pt goals include pain relief  Past medical history was reviewed with Nneka. Patient has a past medical history of BCC (basal cell carcinoma of skin) (2018), BCC (basal cell carcinoma) (2023), Disorder of thyroid, High cholesterol, Hyperlipidemia, Hypothyroid, PONV (postoperative nausea and vomiting), Renal disorder, and Skin cancer (2018). Other co-morbidities include left foot plantar fasciitis, arthritis feet, bone spur  Social hx: Patient is a retired .  She walks 2 miles/3x/week and also does yoga in her home.  Patient also cares for her grandchildren.       ASSESSMENT:   Patient presents to PT clinic due to right hip pain, left knee pain beginning in the summer of 2023.  Patient demos decreased lumbar ROM, slight decreased L knee flexion ROM, hypomobility L patella, hypomobility of lumbar spine, LE strength to 4/5 or  better, altered BLE sensation, decreased B hamstring flexibility, and pain.  These impairments are functionally limiting pt's ability to bend, walk, sit, transfer, lift, sleep, perform ADLs, perform car transfers, negotiate stairs, and drive.  Nneka would benefit from skilled Physical Therapy to address the above impairments to relieve pain.      Precautions:  none   OBJECTIVE:   Observation:  pt ambulates into clinic independently; no knee edema observed    Sensation: numbness to B toes     AROM:   Lumbar ROM:  Flx: WNL (pain)  Ext: WNL (pain)  Rot:  R WNL (pain)   L min loss   Lat flx: R min loss (pain), L WNL (pain)    Knee ROM:  Ext: R 0 deg, L 0 deg  Flx: R 130 deg, L 128 deg     Accessory motion:   Lumbar spine:   PA assessment: hypomobility and tenderness throughout; increased stiffness in lower lumbar spine    Patellar mobility:  R: normal, L: hypomobility with tilt noted; keeps LLE resting in ER    Flexibility:   HS: B min loss    Strength/MMT:   Hip flx: R 4+/5, L 4/5  Hip abd: R 4/5, L 4+/5  Hip ext: R 4+/5, L 4+/5  Knee ext: R 5/5, L 4/5  Knee flx: R 4+/5, L 4/5  Ankle df: B 5/5    Posture: fair/good sitting posture    Gait: pt ambulates into clinic independently; gait not observed this session    Fall History for the past 2 years: none    Functional Outcome Measure: LEFS: 63.75%    Today’s Treatment and Response:  Patient education provided on PT eval findings, treatment plan, goals, HEP  Patient received there ex, HEP  Charges: PT Eval, TE 2      Total Timed Treatment: 45 min     Total Treatment Time: 45 min      PT Eval Low Complexity     PLAN OF CARE:    Goals:    1- Pt will be I with maintenance and progression of HEP  2- Pt will demo increase in lumbar ROM to WNL to ease ability for pt to perform ADLs  3- Pt will demo increase in BLE strength by 1/3 grade to ease ability for pt to negotiate stairs  4- Pt will report decrease in right hip pain and left knee pain to 1/10 or less with  transfers    Frequency / Duration: Patient will be seen for 2x/week or a total of 10-12 visits over a 90 day period. Treatment will include: Modalities prn, manual therapy, therapeutic exercises, therapeutic activity, and instructions on a home program.     Education or treatment limitation: None  Rehab Potential:good    Patient was advised of these findings, precautions, and treatment options and has agreed to actively participate in planning and for this course of care.    Thank you for your referral. Please co-sign or sign and return this letter via fax as soon as possible to [phone]. If you have any questions, please contact me at Dept: [phone]    Sincerely,  Electronically signed by therapist: Tanisha Toledo PT, DPT    Physician's certification required: Yes  I certify the need for these services furnished under this plan of treatment and while under my care.    X___________________________________________________ Date____________________    Certification From: 8/7/2024  To:11/5/2024

## 2024-08-08 ENCOUNTER — TELEPHONE (OUTPATIENT)
Dept: PHYSICAL THERAPY | Facility: HOSPITAL | Age: 69
End: 2024-08-08

## 2024-08-09 ENCOUNTER — OFFICE VISIT (OUTPATIENT)
Dept: PHYSICAL THERAPY | Age: 69
End: 2024-08-09
Attending: FAMILY MEDICINE
Payer: MEDICARE

## 2024-08-09 ENCOUNTER — APPOINTMENT (OUTPATIENT)
Dept: PHYSICAL THERAPY | Age: 69
End: 2024-08-09
Attending: FAMILY MEDICINE
Payer: MEDICARE

## 2024-08-09 PROCEDURE — 97110 THERAPEUTIC EXERCISES: CPT

## 2024-08-09 NOTE — PROGRESS NOTES
Diagnosis: Right hip pain (M25.551)  Acute pain of left knee (M25.562)          Next MD visit: none scheduled    Fall Risk: standard         Precautions: n/a          Medication Changes since last visit?: No    Subjective: Patient reports 5-6/10 right hip soreness today and 5-6/10 left knee pain.       Objective:     Date: 8/9/2024  Visit [facility]: [facility] ([facility])   HEP   -    Therapeutic Exercise   X 34 minutes  - NuStep level 5 (UEs and LEs) x 10 minutes  - supine R/L hamstring stretch with strap 3 x 30 sec holds ea  - sidelying R/L clams 2 x 10 ea  - sidelying R/L hip abduction 2 x 10 ea  - supine L SLR with YTB above knees 2 x 10  - supine bridges 2 x 10  - supine SB bridges 2 x 10  - prone R/L hip extension 10x ea  - shuttle machine B knee ext/flx 4 bands 2 x 10  - standing B gastroc stretch on slant board level 2 3 x 30 sec holds  - standing R/L hip abduction/hip extension 10x ea  - standing B heel raises 10x  - standing lumbar extension 10x   Manual Therapy   X 6 minutes  - foam roll to R gluteal, piriformis, TFL, hamstring, quad muscles   Therapeutic Activity   -    Modalities   -             Assessment: Session focused on global hip and quad strengthening as tolerated.      Plan: continue PT    Charges: Ex 2       Total Timed Treatment: 40 min  Total Treatment Time: 40 min

## 2024-08-12 ENCOUNTER — OFFICE VISIT (OUTPATIENT)
Dept: PHYSICAL THERAPY | Age: 69
End: 2024-08-12
Attending: FAMILY MEDICINE
Payer: MEDICARE

## 2024-08-12 PROCEDURE — 97110 THERAPEUTIC EXERCISES: CPT

## 2024-08-12 NOTE — PROGRESS NOTES
Diagnosis: Right hip pain (M25.551)  Acute pain of left knee (M25.562)          Next MD visit: none scheduled    Fall Risk: standard         Precautions: n/a          Medication Changes since last visit?: No    Subjective: Patient reports 5-6/10 right hip soreness today and 5-6/10 left knee pain.       Objective:     Date: 8/12/2024  Visit #: 3/10 ([facility]) Date: 8/9/2024  Visit #: 2/10 ([facility])   HEP    -    Therapeutic Exercise   X 40 minutes  - NuStep level 5 (UEs and LEs) x 8 minutes  - supine R/L hamstring stretch with strap 3 x 30 sec holds ea  - sidelying R/L clams with YTB above knees 2 x 10 ea  - supine L SLR with YTB above knees 2 x 10  - supine L SLR with 1# 1 x 10  - supine bridges 2 x 10  - supine SB bridges 2 x 10  - prone R/L hip extension 2 x 10 ea  - shuttle machine B knee ext/flx 4 bands 2 x 10  - standing B gastroc stretch on slant board level 2 3 x 30 sec holds  - standing B heel raises 20x  - standing lumbar extension 10x X 34 minutes  - NuStep level 5 (UEs and LEs) x 5 minutes  - supine R/L hamstring stretch with strap 3 x 30 sec holds ea  - sidelying R/L clams 2 x 10 ea  - sidelying R/L hip abduction 2 x 10 ea  - supine L SLR with YTB above knees 2 x 10  - supine bridges 2 x 10  - supine SB bridges 2 x 10  - prone R/L hip extension 10x ea  - shuttle machine B knee ext/flx 4 bands 2 x 10  - standing B gastroc stretch on slant board level 2 3 x 30 sec holds  - standing R/L hip abduction/hip extension 10x ea  - standing B heel raises 10x  - standing lumbar extension 10x   Manual Therapy   X 5 minutes  - foam roll to R gluteal, piriformis, TFL, hamstring, quad muscles X 6 minutes  - foam roll to R gluteal, piriformis, TFL, hamstring, quad muscles   Therapeutic Activity    -    Modalities    -              Assessment:  Advanced hip and quad strengthening interventions via increase in reps and or resistance.     Plan: continue PT    Charges: Ex 3     Total Timed Treatment: 45 min  Total  Treatment Time: 45 min

## 2024-08-14 ENCOUNTER — OFFICE VISIT (OUTPATIENT)
Dept: PHYSICAL THERAPY | Age: 69
End: 2024-08-14
Attending: FAMILY MEDICINE
Payer: MEDICARE

## 2024-08-14 PROCEDURE — 97110 THERAPEUTIC EXERCISES: CPT

## 2024-08-14 PROCEDURE — 97140 MANUAL THERAPY 1/> REGIONS: CPT

## 2024-08-14 NOTE — PROGRESS NOTES
Diagnosis: Right hip pain (M25.551)  Acute pain of left knee (M25.562)          Next MD visit: none scheduled    Fall Risk: standard         Precautions: n/a          Medication Changes since last visit?: No    Subjective: Patient reports the muscle soreness she had went away.  She states her legs in general feel less tight.  Pt reports her pain may be slightly reduced.  4-5/10 pain.  She reports some low back pain today.      Objective:     Date: 8/14/2024  Visit #: 4/10 ([facility]) Date: 8/12/2024  Visit #: 3/10 ([facility]) Date: 8/9/2024  Visit #: 2/10 ([facility])   HEP   - updated HEP - see pt instructions section  -    Therapeutic Exercise   X 37 minutes  - NuStep level 5 (UEs and LEs) x 8 minutes  - supine R/L LTR 10x ea  - hooklying B hip adduction/ball squeeze 20x 5 sec holds  - supine R/L hamstring stretch with strap 3 x 30 sec holds ea  - sidelying R/L clams with RTB above knees 2 x 10 ea  - supine L SLR with 1# 3 x 10  - supine SB bridges 3 x 10  - prone R/L hip extension 2 x 10 ea  - shuttle machine B knee ext/flx 4 bands 3 x 10  - standing B gastroc stretch on slant board level 3 3 x 30 sec holds  - standing lumbar extension 2 x 10 X 40 minutes  - NuStep level 5 (UEs and LEs) x 8 minutes  - supine R/L hamstring stretch with strap 3 x 30 sec holds ea  - sidelying R/L clams with YTB above knees 2 x 10 ea  - supine L SLR with YTB above knees 2 x 10  - supine L SLR with 1# 1 x 10  - supine bridges 2 x 10  - supine SB bridges 2 x 10  - prone R/L hip extension 2 x 10 ea  - shuttle machine B knee ext/flx 4 bands 2 x 10  - standing B gastroc stretch on slant board level 2 3 x 30 sec holds  - standing B heel raises 20x  - standing lumbar extension 10x X 34 minutes  - NuStep level 5 (UEs and LEs) x 5 minutes  - supine R/L hamstring stretch with strap 3 x 30 sec holds ea  - sidelying R/L clams 2 x 10 ea  - sidelying R/L hip abduction 2 x 10 ea  - supine L SLR with YTB above knees 2 x 10  - supine bridges 2 x 10  -  supine SB bridges 2 x 10  - prone R/L hip extension 10x ea  - shuttle machine B knee ext/flx 4 bands 2 x 10  - standing B gastroc stretch on slant board level 2 3 x 30 sec holds  - standing R/L hip abduction/hip extension 10x ea  - standing B heel raises 10x  - standing lumbar extension 10x   Manual Therapy   X 8 minutes  - foam roll to R gluteal, piriformis, TFL, hamstring, quad muscles  - L patella glide/ mobilization X 5 minutes  - foam roll to R gluteal, piriformis, TFL, hamstring, quad muscles X 6 minutes  - foam roll to R gluteal, piriformis, TFL, hamstring, quad muscles   Therapeutic Activity     -    Modalities     -               Assessment:  Initiated standing hip series today to further progress global hip strength.      Plan: continue PT    Charges: Ex 2 Man 1     Total Timed Treatment: 45 min  Total Treatment Time: 50 min

## 2024-08-19 ENCOUNTER — OFFICE VISIT (OUTPATIENT)
Dept: PHYSICAL THERAPY | Age: 69
End: 2024-08-19
Attending: FAMILY MEDICINE
Payer: MEDICARE

## 2024-08-19 PROCEDURE — 97110 THERAPEUTIC EXERCISES: CPT

## 2024-08-19 NOTE — PROGRESS NOTES
Diagnosis: Right hip pain (M25.551)  Acute pain of left knee (M25.562)          Next MD visit: none scheduled    Fall Risk: standard         Precautions: n/a          Medication Changes since last visit?: No    Subjective: Patient reports 6/10 right hip pain upon waking and at the end of the day.  She reports when she is up and walking the pain subsides.  She reports 4/10 left knee pain.  Pt reports no low back pain.        Objective:     Date: 8/19/2024  Visit #: 5/10 ([facility]) Date: 8/14/2024  [facility]: 8/12/2024  [facility]   HEP   - updated HEP - see pt instructions section - updated HEP - see pt instructions section    Therapeutic Exercise   X 43 minutes  - reassessment  - NuStep level 5 (UEs and LEs) x 8 minutes  - supine R/L piriformis stretch 3 x 20 sec holds ea  - supine L SLR with Blue Tband above knees 2 x 10 ea  - supine SB bridges 3 x 10  - supine bridges with RTB above knees 10x  - prone R/L hip extension 3 x 10 ea  - shuttle machine B knee ext/flx 5 bands 2 x 10  - shuttle machine R/L knee ext/flx 3 bands 10x ea  - standing B gastroc stretch on slant board level 3 3 x 30 sec holds  - sidestepping with RTB below knees 4 x 20 feet   - standing lumbar extension 2 x 10 X 37 minutes  - NuStep level 5 (UEs and LEs) x 8 minutes  - supine R/L LTR 10x ea  - hooklying B hip adduction/ball squeeze 20x 5 sec holds  - supine R/L hamstring stretch with strap 3 x 30 sec holds ea  - sidelying R/L clams with RTB above knees 2 x 10 ea  - supine L SLR with 1# 3 x 10  - supine SB bridges 3 x 10  - prone R/L hip extension 2 x 10 ea  - shuttle machine B knee ext/flx 4 bands 3 x 10  - standing B gastroc stretch on slant board level 3 3 x 30 sec holds  - standing lumbar extension 2 x 10 X 40 minutes  - NuStep level 5 (UEs and LEs) x 8 minutes  - supine R/L hamstring stretch with strap 3 x 30 sec holds ea  - sidelying R/L clams with YTB above knees 2 x 10 ea  - supine L SLR with YTB  above knees 2 x 10  - supine L SLR with 1# 1 x 10  - supine bridges 2 x 10  - supine SB bridges 2 x 10  - prone R/L hip extension 2 x 10 ea  - shuttle machine B knee ext/flx 4 bands 2 x 10  - standing B gastroc stretch on slant board level 2 3 x 30 sec holds  - standing B heel raises 20x  - standing lumbar extension 10x   Manual Therapy    X 8 minutes  - foam roll to R gluteal, piriformis, TFL, hamstring, quad muscles  - L patella glide/ mobilization X 5 minutes  - foam roll to R gluteal, piriformis, TFL, hamstring, quad muscles   Therapeutic Activity        Modalities                    Assessment:  Initiated piriformis stretching and progress hip abductor strengthening.      Plan: continue PT    Charges: Ex 3     Total Timed Treatment: 43 min  Total Treatment Time: 43 min

## 2024-08-22 ENCOUNTER — OFFICE VISIT (OUTPATIENT)
Dept: PHYSICAL THERAPY | Age: 69
End: 2024-08-22
Attending: FAMILY MEDICINE
Payer: MEDICARE

## 2024-08-22 PROCEDURE — 97110 THERAPEUTIC EXERCISES: CPT

## 2024-08-22 NOTE — PROGRESS NOTES
Diagnosis: Right hip pain (M25.551)  Acute pain of left knee (M25.562)          Next MD visit: none scheduled    Fall Risk: standard         Precautions: n/a          Medication Changes since last visit?: No    Subjective:  Patient reports her knee and hip are feeling looser.  She reports minimal knee pain today and states her hip is getting better.  Patient reports her knee is doing well when walking, but she will have pain with bending, squatting, and stair negotiation.  Did not rate pain.    Objective:     Date: 8/22/2024  Visit #: 6/10 ([facility]) Date: 8/19/2024  Visit #: 5/10 ([facility]) Date: 8/14/2024  Visit #: 4/10 ([facility])   HEP    - updated HEP - see pt instructions section - updated HEP - see pt instructions section   Therapeutic Exercise   X 41 minutes  - NuStep level 6 (UEs and LEs) x 8 minutes  - supine R/L piriformis stretch 3 x 20 sec holds ea  - supine R/L hamstring stretch with strap 3 x 30 sec holds ea  mis stretch 3 x 20 sec holds ea  - supine L SLR with Blue Tband above knees with 3 pulses 1 x 10 ea  - supine SB bridges 3 x 10  - supine bridges with RTB above knees 2 x 10  - shuttle machine B knee ext/flx 5 bands 2 x 10  - shuttle machine R/L knee ext/flx 3 bands 2 x 10 ea  - standing B gastroc stretch on slant board level 3 3 x 30 sec holds  - standing B heel raises 2 x 10  - R/L forward step ups to opposite LE march with 8 inch step 10x ea (discontinue due to L knee pain) X 43 minutes  - reassessment  - NuStep level 5 (UEs and LEs) x 8 minutes  - supine R/L piriformis stretch 3 x 20 sec holds ea  - supine L SLR with Blue Tband above knees 2 x 10 ea  - supine SB bridges 3 x 10  - supine bridges with RTB above knees 10x  - prone R/L hip extension 3 x 10 ea  - shuttle machine B knee ext/flx 5 bands 2 x 10  - shuttle machine R/L knee ext/flx 3 bands 10x ea  - standing B gastroc stretch on slant board level 3 3 x 30 sec holds  - sidestepping with RTB below knees 4 x 20 feet   - standing lumbar  extension 2 x 10 X 37 minutes  - NuStep level 5 (UEs and LEs) x 8 minutes  - supine R/L LTR 10x ea  - hooklying B hip adduction/ball squeeze 20x 5 sec holds  - supine R/L hamstring stretch with strap 3 x 30 sec holds ea  - sidelying R/L clams with RTB above knees 2 x 10 ea  - supine L SLR with 1# 3 x 10  - supine SB bridges 3 x 10  - prone R/L hip extension 2 x 10 ea  - shuttle machine B knee ext/flx 4 bands 3 x 10  - standing B gastroc stretch on slant board level 3 3 x 30 sec holds  - standing lumbar extension 2 x 10   Manual Therapy     X 8 minutes  - foam roll to R gluteal, piriformis, TFL, hamstring, quad muscles  - L patella glide/ mobilization   Therapeutic Activity        Modalities                    Assessment:  Continued with LE stretching and progress SLR exercise to improve muscular endurance.    Plan: continue PT    Charges: Ex 3     Total Timed Treatment: 41 min  Total Treatment Time: 41 min

## 2024-08-30 ENCOUNTER — OFFICE VISIT (OUTPATIENT)
Dept: PHYSICAL THERAPY | Age: 69
End: 2024-08-30
Attending: FAMILY MEDICINE
Payer: MEDICARE

## 2024-08-30 PROCEDURE — 97110 THERAPEUTIC EXERCISES: CPT

## 2024-08-30 NOTE — PROGRESS NOTES
Diagnosis: Right hip pain (M25.551)  Acute pain of left knee (M25.562)          Next MD visit: none scheduled    Fall Risk: standard         Precautions: n/a          Medication Changes since last visit?: No    Subjective:  Patient reports some right back/hip pain today and rates this as 5/10.  Patient reports much less pain in her knee.     Objective:     Date: 8/30/2024  Visit #: 7/10 ([facility]) Date: 8/22/2024  [facility]   HEP       Therapeutic Exercise   X 45 minutes  - NuStep level 6 (UEs and LEs) x 8 minutes  - supine R/L piriformis stretch with towel 3 x 10 sec holds ea  mis stretch 3 x 20 sec holds ea  - supine L SLR with Blue Tband above knees with 3 pulses 1 x 15 ea  - supine R SLR with Blue Tband above knees with 3 pulses 1 x 10 ea  - sidelying R/L clams with Blue Tband above knees 2 x 10 ea  - supine bridges with RTB above knees with hip ER 2 x 10   - shuttle machine B knee ext/flx 5 bands 3 x 10  - shuttle machine R/L knee ext/flx 3 bands 2 x 10 ea  - standing B gastroc stretch on slant board level 3 3 x 30 sec holds  - standing B heel raises 2 x 10  - R/L forward lunges onto bosu 10x ea  - R/L standing knee drives 10x ea  - sidestepping with RTB above knees 2 x 25 feet  X 41 minutes  - NuStep level 6 (UEs and LEs) x 8 minutes  - supine R/L piriformis stretch 3 x 20 sec holds ea  - supine R/L hamstring stretch with strap 3 x 30 sec holds ea  mis stretch 3 x 20 sec holds ea  - supine L SLR with Blue Tband above knees with 3 pulses 1 x 10 ea  - supine SB bridges 3 x 10  - supine bridges with RTB above knees 2 x 10  - shuttle machine B knee ext/flx 5 bands 2 x 10  - shuttle machine R/L knee ext/flx 3 bands 2 x 10 ea  - standing B gastroc stretch on slant board level 3 3 x 30 sec holds  - standing B heel raises 2 x 10  - R/L forward step ups to opposite LE march with 8 inch step 10x ea (discontinue due to L knee pain)   Manual Therapy       Therapeutic Activity       Modalities                   Assessment:  Advanced LE strengthening via sidestepping and lunges this session.      Plan: continue PT    Charges: Ex 3     Total Timed Treatment: 45 min  Total Treatment Time: 45 min

## 2024-09-12 ENCOUNTER — APPOINTMENT (OUTPATIENT)
Dept: PHYSICAL THERAPY | Age: 69
End: 2024-09-12
Attending: FAMILY MEDICINE
Payer: MEDICARE

## 2024-09-12 NOTE — PROGRESS NOTES
Subjective:   Nneka Britt is a 69 year old female who presents for No chief complaint on file.   HPI    Past Medical History:    BCC (basal cell carcinoma of skin)    right prearuicular     BCC (basal cell carcinoma)    right central chest    Disorder of thyroid    High cholesterol    Hyperlipidemia    Hypothyroid    PONV (postoperative nausea and vomiting)    Renal disorder    Skin cancer    BCC right preauricular      Past Surgical History:   Procedure Laterality Date    Adj tiss xfer head,fac,hand 10.1-30 Right 2018    Wide excision BCC right cheek, flap reconstruction    Colonoscopy  2017    Colonoscopy      remove begnign polyp.  follow-up in 3 years    D & c      excessive bleeding    Hysterectomy  2013      ,,,    Other surgical history      laparascopy    Tonsillectomy          History/Other:    No Further Nursing Notes to Review         Tobacco:  She has never smoked tobacco.    Current Outpatient Medications   Medication Sig Dispense Refill    atorvastatin 20 MG Oral Tab Take 1 tablet (20 mg total) by mouth nightly. 90 tablet 4    levothyroxine 112 MCG Oral Tab Take 1 tablet (112 mcg total) by mouth before breakfast. 90 tablet 4    lisinopril 5 MG Oral Tab Take 1 tablet (5 mg total) by mouth daily. 90 tablet 5    levocetirizine 5 MG Oral Tab Take 1 tablet (5 mg total) by mouth every evening. 90 tablet 3    fluticasone propionate 50 MCG/ACT Nasal Suspension 2 sprays by Each Nare route daily. 48 g 3    Ascorbic Acid (VITAMIN C) 100 MG Oral Tab Take 1 tablet (100 mg total) by mouth daily.      Omega-3-acid Ethyl Esters 1 g Oral Cap Take 1 capsule (1 g total) by mouth 2 (two) times daily.           Review of Systems:  Review of Systems      Objective:   There were no vitals taken for this visit. Estimated body mass index is 26.83 kg/m² as calculated from the following:    Height as of 24: 5' 5\" (1.651 m).    Weight as of 24: 161 lb 3.2 oz  (73.1 kg).  Physical Exam      Assessment & Plan:   There are no diagnoses linked to this encounter.    {Tip  Follow Up:8308}  No follow-ups on file.    Sixto Hsieh, JOSE, 9/12/2024, 4:37 PM

## 2024-09-12 NOTE — PROGRESS NOTES
This is a telemedicine visit with live, interactive video and audio.     Patient understands and accepts financial responsibility for any deductible, co-insurance and/or co-pays associated with this service.    Patient is a pleasant 69-year-old female with past medical history consistent for basal cell carcinoma, thyroid disorder, hyperlipidemia, and CKD 3.  Patient presents via telehealth today for evaluation of concerns with ongoing cough.  Patient states she is feeling better today. She has been having a cough for last 2 weeks. She states it is loose but nothing coming up. This has been keeping her up at night. She has been having headache. Mild sore throat. Change in voice. No fever no chills. Denies sick contacts. She is retired. She was at a lot of recent festivals around many people. She declines viral testing today as she believes she is past that. She has tried OTC meds with limited success.         SUBJECTIVE  Review of Systems   Constitutional:  Negative for chills and fever.   HENT:  Positive for congestion and voice change.    Respiratory:  Positive for cough. Negative for chest tightness, shortness of breath and wheezing.    Cardiovascular:  Negative for chest pain.   Gastrointestinal:  Negative for diarrhea, nausea and vomiting.        HISTORY:  Past Medical History:    BCC (basal cell carcinoma of skin)    right prearuicular     BCC (basal cell carcinoma)    right central chest    Disorder of thyroid    High cholesterol    Hyperlipidemia    Hypothyroid    PONV (postoperative nausea and vomiting)    Renal disorder    Skin cancer    BCC right preauricular      Past Surgical History:   Procedure Laterality Date    Adj tiss xfer head,fac,hand 10.1-30 Right 2018    Wide excision BCC right cheek, flap reconstruction    Colonoscopy  2017    Colonoscopy      remove begnign polyp.  follow-up in 3 years    D & c      excessive bleeding    Hysterectomy  2013       1983,1985,1988,1989    Other surgical history  1989    laparascopy    Tonsillectomy  1959      Family History   Problem Relation Age of Onset    Cancer Self         skin    Cancer Mother     Heart Disorder Mother     Diabetes Father     Cancer Father     Heart Disorder Father     Stroke Father     Pancreatic Cancer Brother         76    Cancer Brother       Social History     Socioeconomic History    Marital status:    Tobacco Use    Smoking status: Never    Smokeless tobacco: Never   Vaping Use    Vaping status: Never Used   Substance and Sexual Activity    Alcohol use: Yes     Alcohol/week: 2.0 standard drinks of alcohol     Types: 2 Glasses of wine per week     Comment: 2-3x/week    Drug use: No   Other Topics Concern    Reaction to local anesthetic No    Pt has a pacemaker No    Pt has a defibrillator No        No Known Allergies   Current Outpatient Medications   Medication Sig Dispense Refill    benzonatate (TESSALON PERLES) 100 MG Oral Cap Take 1 capsule (100 mg total) by mouth 3 (three) times daily as needed for cough. 30 capsule 0    atorvastatin 20 MG Oral Tab Take 1 tablet (20 mg total) by mouth nightly. 90 tablet 4    levothyroxine 112 MCG Oral Tab Take 1 tablet (112 mcg total) by mouth before breakfast. 90 tablet 4    lisinopril 5 MG Oral Tab Take 1 tablet (5 mg total) by mouth daily. 90 tablet 5    levocetirizine 5 MG Oral Tab Take 1 tablet (5 mg total) by mouth every evening. 90 tablet 3    fluticasone propionate 50 MCG/ACT Nasal Suspension 2 sprays by Each Nare route daily. 48 g 3    Ascorbic Acid (VITAMIN C) 100 MG Oral Tab Take 1 tablet (100 mg total) by mouth daily.      Omega-3-acid Ethyl Esters 1 g Oral Cap Take 1 capsule (1 g total) by mouth 2 (two) times daily.         OBJECTIVE  Physical Exam:   alert, appears stated age, and cooperative, Speaking in full sentences comfortably, and Normal work of breathing    ASSESSMENT & PLAN  Problem List Items Addressed This Visit    None  Visit  Diagnoses       Viral URI with cough    -  Primary    Relevant Medications    benzonatate (TESSALON PERLES) 100 MG Oral Cap           1. Viral URI with cough  Potential sick contacts  Viral panel in office declined  Supportive measures reviewed and include rotating antipyretics, using humidifier, hot showers, hot tea with honey.  Increase fluid intake and rest.  Tessalon perles tid prn  Follow up in office for evaluation if persists.    Patient aware of plan of care. All questions answered to satisfaction of the patient. Patient instructed to call office or reach out via [employer] if any issues arise. For urgent issues and/or reviewed red flags please proceed to the urgent care or ER.  Also, inform the nurse practitioner with any new symptoms or medication side effects.        Sixto Hsieh, APRN

## 2024-09-13 ENCOUNTER — TELEMEDICINE (OUTPATIENT)
Dept: FAMILY MEDICINE CLINIC | Facility: CLINIC | Age: 69
End: 2024-09-13
Payer: MEDICARE

## 2024-09-13 DIAGNOSIS — J06.9 VIRAL URI WITH COUGH: Primary | ICD-10-CM

## 2024-09-13 PROCEDURE — 99213 OFFICE O/P EST LOW 20 MIN: CPT

## 2024-09-13 RX ORDER — BENZONATATE 100 MG/1
100 CAPSULE ORAL 3 TIMES DAILY PRN
Qty: 30 CAPSULE | Refills: 0 | Status: SHIPPED | OUTPATIENT
Start: 2024-09-13

## 2024-09-13 NOTE — PATIENT INSTRUCTIONS
You have a viral illness. Stay at home and rest. Avoid close contact with well people in your house so you won't make them sick. Drink plenty of water and other clear liquids to extra fluid loss. Treat fever, cough, and body aches with medicines you can buy at the store. For fever and pain, you may take Tylenol 650 mg every 4-6 hours as needed.  If pain is still bothersome you may take 400 mg of ibuprofen every 4-6 hours as needed and rotate with Tylenol.  If you have a lot of congestion you can try expectorants like guaifenesin.  You can also try over-the-counter cough medicines as well (robitussin).  If you stay sick for longer than 1 week please let me know.

## 2024-09-16 ENCOUNTER — OFFICE VISIT (OUTPATIENT)
Dept: PHYSICAL THERAPY | Age: 69
End: 2024-09-16
Attending: FAMILY MEDICINE
Payer: MEDICARE

## 2024-09-16 PROCEDURE — 97110 THERAPEUTIC EXERCISES: CPT

## 2024-09-16 NOTE — PROGRESS NOTES
Diagnosis: Right hip pain (M25.551)  Acute pain of left knee (M25.562)          Next MD visit: none scheduled    Fall Risk: standard         Precautions: n/a          Medication Changes since last visit?: No    Subjective:  Patient reports her hip and knee are doing better, especially her knee, she reports.  Patient reports she was sick recently so had a few days of rest.      Objective:    9/16/2024:  MMT:  Hip flx: R 5/5, L 5/5  Hip abd: R 4+/5, L 4+/5  Hip ext: R 5/5, 4+/5  Knee ext: R 5/5, L 5/5  Knee flx: R 4+/5, L 4+/5  Ankle df: R 5/5, L 5/5     Date: 9/16/2024  Visit #: 8/10 ([facility]) Date: 8/30/2024  Visit #: 7/10 ([facility]) Date: 8/22/2024  Visit #: 6/10 ([facility])   HEP        Therapeutic Exercise   X 43 minutes  - reassessment  - supine R/L piriformis stretch with towel 3 x 15 sec holds ea  - PPT 10x  - dead bug with heel taps 2 x 3 reps  - sidelying R/L clams with Blue Tband above knees 2 x 10 ea  - supine bridges with RTB above knees with hip ER 2 x 10   - PPU 1 x 10  - shuttle machine B knee ext/flx 6 bands 2 x 10  - shuttle machine R/L knee ext/flx 3-4 bands 2 x 10 ea  - standing B gastroc stretch on slant board level 3 3 x 30 sec holds  - R/L lateral lunges 1 x 10 ea  - standing R/L hip abduction/hip extension/hip flexion with GTB 3 x 10 ea X 45 minutes  - NuStep level 6 (UEs and LEs) x 8 minutes  - supine R/L piriformis stretch with towel 3 x 10 sec holds ea  mis stretch 3 x 20 sec holds ea  - supine L SLR with Blue Tband above knees with 3 pulses 1 x 15 ea  - supine R SLR with Blue Tband above knees with 3 pulses 1 x 10 ea  - sidelying R/L clams with Blue Tband above knees 2 x 10 ea  - supine bridges with RTB above knees with hip ER 2 x 10   - shuttle machine B knee ext/flx 5 bands 3 x 10  - shuttle machine R/L knee ext/flx 3 bands 2 x 10 ea  - standing B gastroc stretch on slant board level 3 3 x 30 sec holds  - standing B heel raises 2 x 10  - R/L forward lunges onto bosu 10x ea  - R/L  standing knee drives 10x ea  - sidestepping with RTB above knees 2 x 25 feet  X 41 minutes  - NuStep level 6 (UEs and LEs) x 8 minutes  - supine R/L piriformis stretch 3 x 20 sec holds ea  - supine R/L hamstring stretch with strap 3 x 30 sec holds ea  mis stretch 3 x 20 sec holds ea  - supine L SLR with Blue Tband above knees with 3 pulses 1 x 10 ea  - supine SB bridges 3 x 10  - supine bridges with RTB above knees 2 x 10  - shuttle machine B knee ext/flx 5 bands 2 x 10  - shuttle machine R/L knee ext/flx 3 bands 2 x 10 ea  - standing B gastroc stretch on slant board level 3 3 x 30 sec holds  - standing B heel raises 2 x 10  - R/L forward step ups to opposite LE march with 8 inch step 10x ea (discontinue due to L knee pain)   Manual Therapy        Therapeutic Activity        Modalities                    Assessment:  Patient demonstrating improved BLE strength with reassessment.     Goals:  1- Pt will be I with maintenance and progression of HEP - MET  2- Pt will demo increase in lumbar ROM to WNL to ease ability for pt to perform ADLs   3- Pt will demo increase in BLE strength by 1/3 grade to ease ability for pt to negotiate stairs  4- Pt will report decrease in right hip pain and left knee pain to 1/10 or less with transfers    Plan: continue PT    Charges: Ex 3     Total Timed Treatment: 43 min  Total Treatment Time: 43 min

## 2024-09-17 ENCOUNTER — TELEPHONE (OUTPATIENT)
Dept: FAMILY MEDICINE CLINIC | Facility: CLINIC | Age: 69
End: 2024-09-17

## 2024-09-17 NOTE — TELEPHONE ENCOUNTER
Patient sent a message via [facility] stating the following:    Ramesh Garnica, I am trying to send a message to you or Sixto Hsieh, whom I spoke with on 9/13/2024. I was told you had no openings till late October. I am not seeing any improvement in my symptoms. Mainly cough for last two and one half weeks,, I am exhausted from coughing and a bit sore, It is a loose cough with nothing coming up. I am still taking allergy meds you gave me. Sixto prescribed benzonatate. It keeps me up at night. Once i start coughing it is hard to stop. Any advice? Thank you, Nneka Britt

## 2024-09-19 ENCOUNTER — APPOINTMENT (OUTPATIENT)
Dept: PHYSICAL THERAPY | Age: 69
End: 2024-09-19
Attending: FAMILY MEDICINE
Payer: MEDICARE

## 2024-10-01 ENCOUNTER — OFFICE VISIT (OUTPATIENT)
Dept: PHYSICAL THERAPY | Age: 69
End: 2024-10-01
Attending: FAMILY MEDICINE
Payer: MEDICARE

## 2024-10-01 PROCEDURE — 97110 THERAPEUTIC EXERCISES: CPT

## 2024-10-01 NOTE — PROGRESS NOTES
Physical Therapy Discharge Summary     Diagnosis: Right hip pain (M25.551)  Acute pain of left knee (M25.562)          Next MD visit: none scheduled    Fall Risk: standard         Precautions: n/a          Medication Changes since last visit?: No  Assessment:  Patient seen for PT services due to right hip pain, left knee pain.   Patient demos improved BLE strength, reports reduced pain, demos improved gait pattern, and has made progress with PT goals.  She has reduced difficulty with functional limitations.  Patient was provided updated HEP and will be discharged from PT.    Subjective:  Patient reports her hip and knee are feeling more flexible and her legs are feeling stronger.  Does not report pain.     Objective:    9/16/2024:  MMT:  Hip flx: R 5/5, L 5/5  Hip abd: R 4+/5, L 4+/5  Hip ext: R 5/5, 4+/5  Knee ext: R 5/5, L 5/5  Knee flx: R 4+/5, L 4+/5  Ankle df: R 5/5, L 5/5     Date: 10/1/2024  Visit #: 9/10 ([facility]) Date: 9/16/2024  Visit #: 8/10 ([facility]) Date: 8/30/2024  Visit #: 7/10 ([facility])   HEP   - updated HEP - see pt instructions section     Therapeutic Exercise   X 45 minutes  - supine R/L piriformis stretch with towel 3 x 15 sec holds ea  - dead bug with heel taps 3 x 3 reps ea  - supine bridges with GTB above knees with hip ER 2 x 10   - PPU 1 x 10  - prone R/L hip extension 3 x 10 ea  - shuttle machine B knee ext/flx 6 bands 2 x 10  - shuttle machine R/L knee ext/flx 4 bands 2 x 10 ea  - standing B gastroc stretch on slant board level 3 3 x 30 sec holds  - standing B heel raises on slant board level 2 2 x 10  - R/L lateral lunges with Blue Tband above knees 1 x 10 ea  - standing R/L hip abduction/hip extension/hip flexion with BlueTB 2 x 10 ea  - R/L SLS 2 x 30 sec holds ea     X 43 minutes  - reassessment  - supine R/L piriformis stretch with towel 3 x 15 sec holds ea  - PPT 10x  - dead bug with heel taps 2 x 3 reps  - sidelying R/L clams with Blue Tband above knees 2 x 10 ea  - supine  bridges with RTB above knees with hip ER 2 x 10   - PPU 1 x 10  - shuttle machine B knee ext/flx 6 bands 2 x 10  - shuttle machine R/L knee ext/flx 3-4 bands 2 x 10 ea  - standing B gastroc stretch on slant board level 3 3 x 30 sec holds  - R/L lateral lunges 1 x 10 ea  - standing R/L hip abduction/hip extension/hip flexion with GTB 3 x 10 ea X 45 minutes  - NuStep level 6 (UEs and LEs) x 8 minutes  - supine R/L piriformis stretch with towel 3 x 10 sec holds ea  mis stretch 3 x 20 sec holds ea  - supine L SLR with Blue Tband above knees with 3 pulses 1 x 15 ea  - supine R SLR with Blue Tband above knees with 3 pulses 1 x 10 ea  - sidelying R/L clams with Blue Tband above knees 2 x 10 ea  - supine bridges with RTB above knees with hip ER 2 x 10   - shuttle machine B knee ext/flx 5 bands 3 x 10  - shuttle machine R/L knee ext/flx 3 bands 2 x 10 ea  - standing B gastroc stretch on slant board level 3 3 x 30 sec holds  - standing B heel raises 2 x 10  - R/L forward lunges onto bosu 10x ea  - R/L standing knee drives 10x ea  - sidestepping with RTB above knees 2 x 25 feet    Manual Therapy        Therapeutic Activity        Modalities                    Goals:  1- Pt will be I with maintenance and progression of HEP - MET  2- Pt will demo increase in lumbar ROM to WNL to ease ability for pt to perform ADLs   3- Pt will demo increase in BLE strength by 1/3 grade to ease ability for pt to negotiate stairs - MET  4- Pt will report decrease in right hip pain and left knee pain to 1/10 or less with transfers     Plan: Discharge PT - transition to home program    Charges: Ex 3     Total Timed Treatment: 45 min  Total Treatment Time: 45 min     - - -

## 2024-10-07 ENCOUNTER — APPOINTMENT (OUTPATIENT)
Dept: GENERAL RADIOLOGY | Age: 69
End: 2024-10-07
Attending: PHYSICIAN ASSISTANT
Payer: MEDICARE

## 2024-10-07 ENCOUNTER — TELEPHONE (OUTPATIENT)
Dept: FAMILY MEDICINE CLINIC | Facility: CLINIC | Age: 69
End: 2024-10-07

## 2024-10-07 ENCOUNTER — HOSPITAL ENCOUNTER (OUTPATIENT)
Age: 69
Discharge: HOME | End: 2024-10-07
Payer: MEDICARE

## 2024-10-07 VITALS
RESPIRATION RATE: 16 BRPM | TEMPERATURE: 98 F | SYSTOLIC BLOOD PRESSURE: 148 MMHG | DIASTOLIC BLOOD PRESSURE: 76 MMHG | OXYGEN SATURATION: 97 % | HEART RATE: 92 BPM

## 2024-10-07 DIAGNOSIS — R05.9 COUGH: Primary | ICD-10-CM

## 2024-10-07 DIAGNOSIS — J01.90 ACUTE NON-RECURRENT SINUSITIS, UNSPECIFIED LOCATION: ICD-10-CM

## 2024-10-07 DIAGNOSIS — R03.0 ELEVATED BLOOD PRESSURE READING: ICD-10-CM

## 2024-10-07 PROCEDURE — 99213 OFFICE O/P EST LOW 20 MIN: CPT | Performed by: PHYSICIAN ASSISTANT

## 2024-10-07 PROCEDURE — 71046 X-RAY EXAM CHEST 2 VIEWS: CPT | Performed by: PHYSICIAN ASSISTANT

## 2024-10-07 RX ORDER — ALBUTEROL SULFATE 90 UG/1
2 INHALANT RESPIRATORY (INHALATION) EVERY 4 HOURS PRN
Qty: 1 EACH | Refills: 0 | Status: SHIPPED | OUTPATIENT
Start: 2024-10-07 | End: 2024-11-06

## 2024-10-07 NOTE — ED PROVIDER NOTES
Patient Seen in: [facility]    History     Chief Complaint   Patient presents with    Cough     Stated Complaint: COUGH; CONGESTION    HPI    Nneka Britt is a 69 year old female presents with chief complaint of cough.  Onset 5 weeks ago.  Patient reports associated nasal congestion and sinus pain that has significantly worsened over the past 3 days.  Patient denies fever, chills, earache, sore throat, abdominal pain, nausea, vomiting, diarrhea, constipation, dysuria, hematuria, flank pain, rash, neck pain, neck swelling, restricted neck movement, dyspnea, wheeze, hemoptysis, weakness, paresthesias, vision changes, altered mental status, loss of consciousness, amnesia.      Past Medical History:    BCC (basal cell carcinoma of skin)    right prearuicular     BCC (basal cell carcinoma)    right central chest    Disorder of thyroid    High cholesterol    Hyperlipidemia    Hypothyroid    PONV (postoperative nausea and vomiting)    Renal disorder    Skin cancer    BCC right preauricular       Past Surgical History:   Procedure Laterality Date    Adj tiss xfer head,fac,hand 10.1-30 Right 2018    Wide excision BCC right cheek, flap reconstruction    Colonoscopy  2017    Colonoscopy      remove begnign polyp.  follow-up in 3 years    D & c  1990    excessive bleeding    Hysterectomy  2013      ,,,    Other surgical history      laparascopy    Tonsillectomy  9            Family History   Problem Relation Age of Onset    Cancer Self         skin    Cancer Mother     Heart Disorder Mother     Diabetes Father     Cancer Father     Heart Disorder Father     Stroke Father     Pancreatic Cancer Brother         76    Cancer Brother        Social History     Socioeconomic History    Marital status:    Tobacco Use    Smoking status: Never    Smokeless tobacco: Never   Vaping Use    Vaping status: Never Used   Substance and Sexual Activity    Alcohol use: Yes      Alcohol/week: 2.0 standard drinks of alcohol     Types: 2 Glasses of wine per week     Comment: 2-3x/week    Drug use: No   Other Topics Concern    Reaction to local anesthetic No    Pt has a pacemaker No    Pt has a defibrillator No       Review of Systems    Positive for stated complaint: COUGH; CONGESTION  Other systems are as noted in HPI.  Constitutional and vital signs reviewed.      All other systems reviewed and negative except as noted above.    PSFH elements reviewed from today and agreed except as otherwise stated in HPI.    Physical Exam     ED Triage Vitals [10/07/24 1513]   /76   Pulse 92   Resp 16   Temp 97.5 °F (36.4 °C)   Temp src Temporal   SpO2 97 %   O2 Device None (Room air)       Current:/76   Pulse 92   Temp 97.5 °F (36.4 °C) (Temporal)   Resp 16   SpO2 97%     PULSE OX within normal limits on room air as interpreted by this provider.     Constitutional: The patient is cooperative. Appears well-developed and well-nourished.  Mild discomfort.  Psychological: Alert, No abnormalities of mood, affect.  Head: Normocephalic/atraumatic.   Eyes: Pupils are equal round reactive to light.  Conjunctiva are within normal limits.  ENT: Pharynx noninjected.  Tonsils within normal size limits bilaterally.  No tonsillar exudates.  TMs within normal limits bilaterally.  Mucous membranes moist.  Positive nasal congestion.  Neck: The neck is supple.  Nontender.  No meningeal signs.  Chest: There is no tenderness to the chest wall.  No CVA tenderness bilaterally.  Respiratory: Respiratory effort was normal.  Positive rhonchi left lung.  There is no stridor.  Air entry is equal.  Cardiovascular: Regular rate and rhythm.  Capillary refill is brisk.    Lymphatic: No gross lymphadenopathy noted.  Musculoskeletal: Musculoskeletal system is grossly intact.  There is no obvious deformity.  Neurological: No facial asymmetry.  Normal gait.  Normal sensory exam.  Patient exhibits normal speech.   Strength and range of motion symmetrical of all extremities x4.  Skin: Skin is normal to inspection.  Warm and dry.  No obvious bruising.  No obvious rash.        ED Course   Labs Reviewed - No data to display    MDM     Differential diagnosis including but not limited to URI, bronchitis, pneumonia, sinusitis    Radiology findings: XR CHEST PA + LAT CHEST (CPT=71046)    Result Date: 10/7/2024  CONCLUSION:   Linear bibasilar and right perihilar opacities, which are favored to reflect atelectasis/scarring.     Dictated by (CST): Teto Platt MD on 10/07/2024 at 3:45 PM     Finalized by (CST): Teto Platt MD on 10/07/2024 at 3:48 PM           Chest x-ray images independently reviewed by this provider-no pneumonia.     Physical exam remained stable over serial reexaminations as previously documented.  Results reviewed with patient.    I have given the patient instructions regarding their diagnoses, expectations, follow up, and ER precautions. I explained to the patient that emergent conditions may arise and to go to the ER for new, worsening or any persistent conditions. I've explained the importance of following up with their doctor as instructed. The patient verbalized understanding of the discharge instructions and plan.    The patient was informed of their elevated blood pressure reading at immediate care.  They were informed of the dangers of undiagnosed and untreated hypertension.  Education regarding lifestyle modifications and the need for appropriate follow-up with their PCP to have their blood pressure re-checked within 24-48 hours was provided.     Disposition and Plan     Clinical Impression:  1. Cough    2. Acute non-recurrent sinusitis, unspecified location    3. Elevated blood pressure reading        Disposition:  Discharge    Follow-up:  Alexandria Garnica MD  [address] [place] [id number]  [phone]    Call in 1 day  For follow-up      Medications Prescribed:  Current  Discharge Medication List        START taking these medications    Details   amoxicillin clavulanate 875-125 MG Oral Tab Take 1 tablet by mouth 2 (two) times daily for 7 days.  Qty: 14 tablet, Refills: 0      albuterol 108 (90 Base) MCG/ACT Inhalation Aero Soln Inhale 2 puffs into the lungs every 4 (four) hours as needed for Wheezing.  Qty: 1 each, Refills: 0      Spacer/Aero-Holding Chambers Does not apply Device Use with albuterol inhaler  Qty: 1 each, Refills: 0

## 2024-10-07 NOTE — TELEPHONE ENCOUNTER
Patient (name and  verified) c/o continued cough for several weeks. Denies any difficulty breathing or chest pain. Denies any fever. Patient did a video visit 3 weeks ago for similar symptoms. Patient states that she never got better. Offered an appt tomorrow morning. Patient states she has a scheduling conflict so offered to check another office location or to go to urgent care today to be seen. Patient will go to the urgent care today.

## 2024-12-27 ENCOUNTER — HOSPITAL ENCOUNTER (OUTPATIENT)
Dept: MAMMOGRAPHY | Age: 69
Discharge: HOME | End: 2024-12-27
Attending: FAMILY MEDICINE
Payer: MEDICARE

## 2024-12-27 DIAGNOSIS — Z00.00 ENCOUNTER FOR ANNUAL HEALTH EXAMINATION: ICD-10-CM

## 2024-12-27 DIAGNOSIS — Z12.31 SCREENING MAMMOGRAM FOR BREAST CANCER: ICD-10-CM

## 2024-12-27 PROCEDURE — 77067 SCR MAMMO BI INCL CAD: CPT | Performed by: FAMILY MEDICINE

## 2024-12-27 PROCEDURE — 77063 BREAST TOMOSYNTHESIS BI: CPT | Performed by: FAMILY MEDICINE

## 2024-12-29 RX ORDER — LEVOCETIRIZINE DIHYDROCHLORIDE 5 MG/1
5 TABLET, FILM COATED ORAL EVERY EVENING
Qty: 90 TABLET | Refills: 3 | Status: SHIPPED | OUTPATIENT
Start: 2024-12-29

## 2024-12-29 NOTE — TELEPHONE ENCOUNTER
Refill passed per [facility] protocol.    Requested Prescriptions   Pending Prescriptions Disp Refills    LEVOCETIRIZINE 5 MG Oral Tab [Pharmacy Med Name: LEVOCETIRIZINE 5 MG TABLET] 90 tablet 3     Sig: TAKE 1 TABLET BY MOUTH EVERY DAY IN THE EVENING       Allergy Medication Protocol Passed - 12/29/2024 10:27 AM        Passed - In person appointment or virtual visit in the past 12 mos or appointment in next 3 mos     Recent Outpatient Visits              2 months ago     [facility] [place] Tanisha Toledo, PT    Office Visit    3 months ago     [facility]    [facility] Visit    3 months ago Viral URI with cough    [facility], WillamSixto Sampson APRN    Telemedicine    4 months ago     [facility]    Office Visit    4 months ago     [facility]                               Recent Outpatient Visits              2 months ago     [facility] [place] Tanisha Toledo, PT    Office Visit    3 months ago     [facility]    Office Visit    3 months ago Viral URI with cough    [facility], Sixto Villarreal APRN    Telemedicine    4 months ago     [facility]    [facility] Visit    4 months ago     [facility]    [facility] Visit

## 2025-01-16 ENCOUNTER — HOSPITAL ENCOUNTER (OUTPATIENT)
Dept: ULTRASOUND IMAGING | Facility: HOSPITAL | Age: 70
Discharge: HOME | End: 2025-01-16
Attending: FAMILY MEDICINE
Payer: MEDICARE

## 2025-01-16 ENCOUNTER — HOSPITAL ENCOUNTER (OUTPATIENT)
Dept: MAMMOGRAPHY | Facility: HOSPITAL | Age: 70
Discharge: HOME | End: 2025-01-16
Attending: FAMILY MEDICINE
Payer: MEDICARE

## 2025-01-16 DIAGNOSIS — R92.8 ABNORMAL MAMMOGRAM: ICD-10-CM

## 2025-01-16 PROCEDURE — 77065 DX MAMMO INCL CAD UNI: CPT | Performed by: FAMILY MEDICINE

## 2025-01-16 PROCEDURE — 77061 BREAST TOMOSYNTHESIS UNI: CPT | Performed by: FAMILY MEDICINE

## 2025-01-16 PROCEDURE — 76642 ULTRASOUND BREAST LIMITED: CPT | Performed by: FAMILY MEDICINE

## 2025-01-16 NOTE — PROGRESS NOTES
Radiologist recommends repeat imaging in 6 months to monitor left breast cysts. I placed the order. - Dr. Garnica

## 2025-03-05 RX ORDER — FLUTICASONE PROPIONATE 50 MCG
2 SPRAY, SUSPENSION (ML) NASAL DAILY
Qty: 48 G | Refills: 3 | Status: SHIPPED | OUTPATIENT
Start: 2025-03-05

## 2025-03-05 NOTE — TELEPHONE ENCOUNTER
Refill Per Protocol   Medication List  As of 3/2/2025 11:59 PM      Fluticasone Propionate 50 MCG/ACT 2 sprays Each Nare Daily

## 2025-06-13 ENCOUNTER — TELEPHONE (OUTPATIENT)
Facility: CLINIC | Age: 70
End: 2025-06-13

## 2025-06-13 NOTE — TELEPHONE ENCOUNTER
Patient outreach message received:    Recall CLN in 3 years per Dr. Blandon.      Last CLN done 08/09/2022.      Recall entered into Patient Outreach for 08/09/2025.    Recall reminder letter sent out to patient via [employer].

## 2025-07-08 ENCOUNTER — OFFICE VISIT (OUTPATIENT)
Dept: FAMILY MEDICINE CLINIC | Facility: CLINIC | Age: 70
End: 2025-07-08
Payer: MEDICARE

## 2025-07-08 ENCOUNTER — LAB ENCOUNTER (OUTPATIENT)
Dept: LAB | Age: 70
End: 2025-07-08
Attending: FAMILY MEDICINE
Payer: MEDICARE

## 2025-07-08 VITALS
HEART RATE: 61 BPM | HEIGHT: 65 IN | BODY MASS INDEX: 26.99 KG/M2 | DIASTOLIC BLOOD PRESSURE: 78 MMHG | WEIGHT: 162 LBS | SYSTOLIC BLOOD PRESSURE: 113 MMHG

## 2025-07-08 DIAGNOSIS — Z78.0 POST-MENOPAUSAL: ICD-10-CM

## 2025-07-08 DIAGNOSIS — E55.9 VITAMIN D DEFICIENCY: ICD-10-CM

## 2025-07-08 DIAGNOSIS — Z85.828 PERSONAL HISTORY OF SKIN CANCER: ICD-10-CM

## 2025-07-08 DIAGNOSIS — N18.32 STAGE 3B CHRONIC KIDNEY DISEASE (HCC): Primary | ICD-10-CM

## 2025-07-08 DIAGNOSIS — Z12.31 SCREENING MAMMOGRAM FOR BREAST CANCER: ICD-10-CM

## 2025-07-08 DIAGNOSIS — E78.2 MIXED HYPERLIPIDEMIA: ICD-10-CM

## 2025-07-08 DIAGNOSIS — E03.8 OTHER SPECIFIED HYPOTHYROIDISM: ICD-10-CM

## 2025-07-08 DIAGNOSIS — Z00.00 ENCOUNTER FOR ANNUAL HEALTH EXAMINATION: ICD-10-CM

## 2025-07-08 LAB
ALBUMIN SERPL-MCNC: 4.9 G/DL (ref 3.2–4.8)
ALBUMIN/GLOB SERPL: 1.8 {RATIO} (ref 1–2)
ALP LIVER SERPL-CCNC: 73 U/L (ref 55–142)
ALT SERPL-CCNC: 17 U/L (ref 10–49)
ANION GAP SERPL CALC-SCNC: 6 MMOL/L (ref 0–18)
AST SERPL-CCNC: 20 U/L (ref ?–34)
BASOPHILS # BLD AUTO: 0.04 X10(3) UL (ref 0–0.2)
BASOPHILS NFR BLD AUTO: 0.6 %
BILIRUB SERPL-MCNC: 0.9 MG/DL (ref 0.2–1.1)
BUN BLD-MCNC: 19 MG/DL (ref 9–23)
BUN/CREAT SERPL: 15.7 (ref 10–20)
CALCIUM BLD-MCNC: 10.5 MG/DL (ref 8.7–10.4)
CHLORIDE SERPL-SCNC: 107 MMOL/L (ref 98–112)
CHOLEST SERPL-MCNC: 158 MG/DL (ref ?–200)
CO2 SERPL-SCNC: 29 MMOL/L (ref 21–32)
CREAT BLD-MCNC: 1.21 MG/DL (ref 0.55–1.02)
DEPRECATED RDW RBC AUTO: 42.7 FL (ref 35.1–46.3)
EGFRCR SERPLBLD CKD-EPI 2021: 48 ML/MIN/1.73M2 (ref 60–?)
EOSINOPHIL # BLD AUTO: 0.17 X10(3) UL (ref 0–0.7)
EOSINOPHIL NFR BLD AUTO: 2.5 %
ERYTHROCYTE [DISTWIDTH] IN BLOOD BY AUTOMATED COUNT: 12.5 % (ref 11–15)
FASTING PATIENT LIPID ANSWER: YES
FASTING STATUS PATIENT QL REPORTED: YES
GLOBULIN PLAS-MCNC: 2.7 G/DL (ref 2–3.5)
GLUCOSE BLD-MCNC: 97 MG/DL (ref 70–99)
HCT VFR BLD AUTO: 45.2 % (ref 35–48)
HDLC SERPL-MCNC: 34 MG/DL (ref 40–59)
HGB BLD-MCNC: 14.6 G/DL (ref 12–16)
IMM GRANULOCYTES # BLD AUTO: 0.03 X10(3) UL (ref 0–1)
IMM GRANULOCYTES NFR BLD: 0.4 %
LDLC SERPL CALC-MCNC: 89 MG/DL (ref ?–100)
LYMPHOCYTES # BLD AUTO: 2.11 X10(3) UL (ref 1–4)
LYMPHOCYTES NFR BLD AUTO: 30.7 %
MCH RBC QN AUTO: 29.9 PG (ref 26–34)
MCHC RBC AUTO-ENTMCNC: 32.3 G/DL (ref 31–37)
MCV RBC AUTO: 92.4 FL (ref 80–100)
MONOCYTES # BLD AUTO: 0.48 X10(3) UL (ref 0.1–1)
MONOCYTES NFR BLD AUTO: 7 %
NEUTROPHILS # BLD AUTO: 4.04 X10 (3) UL (ref 1.5–7.7)
NEUTROPHILS # BLD AUTO: 4.04 X10(3) UL (ref 1.5–7.7)
NEUTROPHILS NFR BLD AUTO: 58.8 %
NONHDLC SERPL-MCNC: 124 MG/DL (ref ?–130)
OSMOLALITY SERPL CALC.SUM OF ELEC: 296 MOSM/KG (ref 275–295)
PLATELET # BLD AUTO: 232 10(3)UL (ref 150–450)
POTASSIUM SERPL-SCNC: 4.6 MMOL/L (ref 3.5–5.1)
PROT SERPL-MCNC: 7.6 G/DL (ref 5.7–8.2)
RBC # BLD AUTO: 4.89 X10(6)UL (ref 3.8–5.3)
SODIUM SERPL-SCNC: 142 MMOL/L (ref 136–145)
T4 FREE SERPL-MCNC: 2 NG/DL (ref 0.8–1.7)
TRIGL SERPL-MCNC: 203 MG/DL (ref 30–149)
TSI SER-ACNC: 0.39 UIU/ML (ref 0.55–4.78)
VIT B12 SERPL-MCNC: 408 PG/ML (ref 211–911)
VIT D+METAB SERPL-MCNC: 45.7 NG/ML (ref 30–100)
VLDLC SERPL CALC-MCNC: 33 MG/DL (ref 0–30)
WBC # BLD AUTO: 6.9 X10(3) UL (ref 4–11)

## 2025-07-08 PROCEDURE — 82306 VITAMIN D 25 HYDROXY: CPT

## 2025-07-08 PROCEDURE — 84443 ASSAY THYROID STIM HORMONE: CPT | Performed by: FAMILY MEDICINE

## 2025-07-08 PROCEDURE — 36415 COLL VENOUS BLD VENIPUNCTURE: CPT | Performed by: FAMILY MEDICINE

## 2025-07-08 PROCEDURE — 82607 VITAMIN B-12: CPT | Performed by: FAMILY MEDICINE

## 2025-07-08 PROCEDURE — 85025 COMPLETE CBC W/AUTO DIFF WBC: CPT | Performed by: FAMILY MEDICINE

## 2025-07-08 PROCEDURE — 80053 COMPREHEN METABOLIC PANEL: CPT | Performed by: FAMILY MEDICINE

## 2025-07-08 PROCEDURE — 80061 LIPID PANEL: CPT | Performed by: FAMILY MEDICINE

## 2025-07-08 PROCEDURE — 84439 ASSAY OF FREE THYROXINE: CPT | Performed by: FAMILY MEDICINE

## 2025-07-08 RX ORDER — LEVOTHYROXINE SODIUM 112 UG/1
112 TABLET ORAL
Qty: 90 TABLET | Refills: 4 | Status: SHIPPED | OUTPATIENT
Start: 2025-07-08

## 2025-07-08 RX ORDER — LISINOPRIL 5 MG/1
5 TABLET ORAL DAILY
Qty: 90 TABLET | Refills: 5 | Status: SHIPPED | OUTPATIENT
Start: 2025-07-08

## 2025-07-08 RX ORDER — LEVOCETIRIZINE DIHYDROCHLORIDE 5 MG/1
5 TABLET, FILM COATED ORAL EVERY EVENING
Qty: 90 TABLET | Refills: 4 | Status: SHIPPED | OUTPATIENT
Start: 2025-07-08

## 2025-07-08 RX ORDER — ATORVASTATIN CALCIUM 20 MG/1
20 TABLET, FILM COATED ORAL NIGHTLY
Qty: 90 TABLET | Refills: 4 | Status: SHIPPED | OUTPATIENT
Start: 2025-07-08

## 2025-07-08 RX ORDER — FLUTICASONE PROPIONATE 50 MCG
2 SPRAY, SUSPENSION (ML) NASAL DAILY
Qty: 48 G | Refills: 4 | Status: SHIPPED | OUTPATIENT
Start: 2025-07-08

## 2025-07-08 NOTE — PROGRESS NOTES
The following individual(s) verbally consented to be recorded using ambient AI listening technology and understand that they can each withdraw their consent to this listening technology at any point by asking the clinician to turn off or pause the recording:    Patient name: Nneka Britt    Subjective:   Nneka Britt is a 70 year old female who presents for a MA AHA (Medicare Advantage Annual Health Assessment) and Subsequent Annual Wellness visit (Pt already had Initial Annual Wellness) and scheduled follow up of multiple significant but stable problems.   History of Present Illness  Nneka Britt is a 70 year old female who presents for a routine physical exam.    She feels well with no current concerns or symptoms. She maintains an active lifestyle through walking and engaging in housework or yard work, which she considers as strength training.    Her current medications include levothyroxine 112 micrograms, atorvastatin, lisinopril, allergy medications, Flonase, and levocetirizine (Xyzal). She confirms continued use of Flonase and levocetirizine.    She is due for a bone scan next month, which is done every two years, and a repeat mammogram, which she plans to schedule this month. She is also due for a repeat colonoscopy this year, which she has yet to schedule.    No pain, constipation, swelling, or any other concerns.    History/Other:   Fall Risk Assessment:   She has been screened for Falls and is low risk.      Cognitive Assessment:   She had a completely normal cognitive assessment - see flowsheet entries     Functional Ability/Status:   Nneka Britt has some abnormal functions as listed below:  She has Vision problems based on screening of functional status.       Depression Screening (PHQ):  PHQ-2 SCORE: 0  , done 7/1/2025          Advanced Directives:   She does have a Living Will but we do NOT have it on file in Epic.    She does have a POA but we do NOT have it on file in  Epic.    Discussed Advance Care Planning with patient (and family/surrogate if present). Standard forms made available to patient in After Visit Summary.      Problem List[1]  Allergies:  She has no known allergies.    Current Medications:  Active Meds, Sig Only[2]    Medical History:  She  has a past medical history of Allergic rhinitis (2023), BCC (basal cell carcinoma of skin) (), BCC (basal cell carcinoma) (), Chronic kidney disease (CKD) (), Colon polyp (), Disorder of thyroid, High cholesterol, Hyperlipidemia, Hypothyroid, Kidney disease (), PONV (postoperative nausea and vomiting), Renal disorder, and Skin cancer ().  Surgical History:  She  has a past surgical history that includes colonoscopy (2017); hysterectomy (2013); tonsillectomy (); other surgical history (); adj tiss xfer head,fac,hand 10.1-30 (Right, 2018); colonoscopy (); d & c ();  (,,,); skin surgery (basal cell skin [id number]); total abdom hysterectomy (?); and vaginal hysterectomy (2013).   Family History:  Her family history includes Basal Cell in her self; Cancer in her brother, father, mother, and self; Diabetes in her father; Heart Disorder in her father and mother; Hypertension in her father and mother; Pancreatic Cancer in her brother, brother, and brother; Stroke in her father.  Social History:  She  reports that she has never smoked. She has never used smokeless tobacco. She reports current alcohol use of about 2.0 standard drinks of alcohol per week. She reports that she does not use drugs.    Tobacco:  She has never smoked tobacco.    CAGE Alcohol Screen:   CAGE screening score of 0 on 2025, showing low risk of alcohol abuse.      Patient Care Team:  Alexandria Garnica MD as PCP - General (Family Practice)  Rishabh Santiago MD (GASTROENTEROLOGY)    Review of Systems     Negative except see HPI     Objective:   Physical Exam  Constitutional:        Appearance: She is well-developed.   HENT:      Right Ear: Tympanic membrane normal.      Left Ear: Tympanic membrane normal.   Eyes:      Conjunctiva/sclera: Conjunctivae normal.   Cardiovascular:      Rate and Rhythm: Normal rate and regular rhythm.      Heart sounds: Normal heart sounds.   Pulmonary:      Effort: Pulmonary effort is normal.      Breath sounds: Normal breath sounds.   Abdominal:      General: Bowel sounds are normal.      Palpations: Abdomen is soft.   Neurological:      Mental Status: She is alert.   Psychiatric:         Behavior: Behavior normal.           /78   Pulse 61   Ht 5' 5\" (1.651 m)   Wt 162 lb (73.5 kg)   BMI 26.96 kg/m²  Estimated body mass index is 26.96 kg/m² as calculated from the following:    Height as of this encounter: 5' 5\" (1.651 m).    Weight as of this encounter: 162 lb (73.5 kg).    Medicare Hearing Assessment:   Hearing Screening    Time taken: 7/8/2025  9:11 AM  Screening Method: Questionnaire  I have a problem hearing over the telephone: No I have trouble following the conversations when two or more people are talking at the same time: No   I have trouble understanding things on the TV: No I have to strain to understand conversations: No   I have to worry about missing the telephone ring or doorbell: No I have trouble hearing conversations in a noisy background such as a crowded room or restaurant: No   I get confused about where sounds come from: No I misunderstand some words in a sentence and need to ask people to repeat themselves: No   I especially have trouble understanding the speech of women and children: No I have trouble understanding the speaker in a large room such as at a meeting or place of Restorationist: No   Many people I talk to seem to mumble (or don't speak clearly): No People get annoyed because I misunderstand what they say: No   I misunderstand what others are saying and make inappropriate responses: No I avoid social activities because I  cannot hear well and fear I will reply improperly: No   Family members and friends have told me they think I may have hearing loss: No             Visual Acuity:   Right Eye Visual Acuity: Uncorrected Right Eye Chart Acuity: 20/30   Left Eye Visual Acuity: Uncorrected Left Eye Chart Acuity: 20/30   Both Eyes Visual Acuity: Uncorrected Both Eyes Chart Acuity: 20/30            Assessment & Plan:   Nneka Britt is a 70 year old female who presents for a Medicare Assessment.     1. Stage 3b chronic kidney disease (HCC) (Primary)  2. Mixed hyperlipidemia  -     Atorvastatin Calcium; Take 1 tablet (20 mg total) by mouth nightly.  Dispense: 90 tablet; Refill: 4  3. Other specified hypothyroidism  4. Personal history of skin cancer  5. Encounter for annual health examination  -     CBC With Differential With Platelet  -     Comp Metabolic Panel (14)  -     Lipid Panel  -     XR DEXA BONE DENSITOMETRY (CPT=77080); Future; Expected date: 07/08/2025  -     Vitamin D; Future; Expected date: 07/08/2025  -     Vitamin B12  -     TSH W Reflex To Free T4  6. Screening mammogram for breast cancer  7. Post-menopausal  -     XR DEXA BONE DENSITOMETRY (CPT=77080); Future; Expected date: 07/08/2025  8. Vitamin D deficiency  -     Vitamin D; Future; Expected date: 07/08/2025  Other orders  -     Levothyroxine Sodium; Take 1 tablet (112 mcg total) by mouth before breakfast.  Dispense: 90 tablet; Refill: 4  -     Lisinopril; Take 1 tablet (5 mg total) by mouth daily.  Dispense: 90 tablet; Refill: 5  -     Levocetirizine Dihydrochloride; Take 1 tablet (5 mg total) by mouth every evening.  Dispense: 90 tablet; Refill: 4  -     Fluticasone Propionate; 2 sprays by Nasal route daily.  Dispense: 48 g; Refill: 4  Assessment & Plan  General Health Maintenance  Routine screenings due. Engages in physical activity.  - Order bone scan for next month.  - Instruct to schedule mammogram this month.  - Instruct to schedule colonoscopy.  - Encourage  continued physical activity.    Hypertension and CKD  Well-managed with lisinopril.  - Continue lisinopril.stay well hydrated     Hyperlipidemia  Well-managed with atorvastatin.  - Continue atorvastatin.    Hypothyroidism  Well-managed on levothyroxine 112 micrograms.  - Continue levothyroxine 112 micrograms.    Allergic Rhinitis  Managed with Flonase and levocetirizine.  - Continue Flonase.  - Continue levocetirizine (Xyzal).  The patient indicates understanding of these issues and agrees to the plan.  Reinforced healthy diet, lifestyle, and exercise.      No follow-ups on file.     Alexandria Garnica MD, 7/8/2025     Supplementary Documentation:   General Health:  In the past six months, have you lost more than 10 pounds without trying?: (Patient-Rptd) 2 - No  Has your appetite been poor?: (Patient-Rptd) No  Type of Diet: (Patient-Rptd) Balanced  How does the patient maintain a good energy level?: (Patient-Rptd) Appropriate Exercise, Daily Walks, Stretching  How would you describe your daily physical activity?: (Patient-Rptd) Heavy  How would you describe your current health state?: (Patient-Rptd) Good  How do you maintain positive mental well-being?: (Patient-Rptd) Social Interaction, Puzzles, Games, Visiting Friends, Visiting Family  On a scale of 0 to 10, with 0 being no pain and 10 being severe pain, what is your pain level?: (Patient-Rptd) 2 - (Mild)  In the past six months, have you experienced urine leakage?: (Patient-Rptd) 0-No  At any time do you feel concerned for the safety/well-being of yourself and/or your children, in your home or elsewhere?: (Patient-Rptd) No  Have you had any immunizations at another office such as Influenza, Hepatitis B, Tetanus, or Pneumococcal?: (Patient-Rptd) Yes    Health Maintenance   Topic Date Due    Annual Well Visit  01/01/2025    Annual Depression Screening  01/01/2025    COVID-19 Vaccine (5 - 2024-25 season) 04/28/2025    Colorectal Cancer Screening  08/09/2025     Influenza Vaccine (1) 10/01/2025    Mammogram  01/16/2026    DEXA Scan  Completed    Fall Risk Screening (Annual)  Completed    Pneumococcal Vaccine: 50+ Years  Completed    Zoster Vaccines  Completed    Meningococcal B Vaccine  Aged Out            [1]   Patient Active Problem List  Diagnosis    Mixed hyperlipidemia    Other specified hypothyroidism    Stage 3b chronic kidney disease (HCC)    Personal history of skin cancer   [2]   Outpatient Medications Marked as Taking for the 7/8/25 encounter (Office Visit) with Alexandria Garnica MD   Medication Sig    levothyroxine 112 MCG Oral Tab Take 1 tablet (112 mcg total) by mouth before breakfast.    atorvastatin 20 MG Oral Tab Take 1 tablet (20 mg total) by mouth nightly.    lisinopril 5 MG Oral Tab Take 1 tablet (5 mg total) by mouth daily.    levocetirizine 5 MG Oral Tab Take 1 tablet (5 mg total) by mouth every evening.    fluticasone propionate 50 MCG/ACT Nasal Suspension 2 sprays by Nasal route daily.    Ascorbic Acid (VITAMIN C) 100 MG Oral Tab Take 1 tablet (100 mg total) by mouth daily.    Omega-3-acid Ethyl Esters 1 g Oral Cap Take 1 capsule (1 g total) by mouth 2 (two) times daily.

## 2025-07-14 ENCOUNTER — TELEPHONE (OUTPATIENT)
Facility: CLINIC | Age: 70
End: 2025-07-14

## 2025-07-14 ENCOUNTER — RESULTS FOLLOW-UP (OUTPATIENT)
Dept: FAMILY MEDICINE CLINIC | Facility: CLINIC | Age: 70
End: 2025-07-14

## 2025-07-14 DIAGNOSIS — N18.31 STAGE 3A CHRONIC KIDNEY DISEASE (HCC): ICD-10-CM

## 2025-07-14 DIAGNOSIS — Z86.0100 HX OF COLONIC POLYP: Primary | ICD-10-CM

## 2025-07-14 DIAGNOSIS — E03.8 OTHER SPECIFIED HYPOTHYROIDISM: ICD-10-CM

## 2025-07-14 DIAGNOSIS — E83.52 HYPERCALCEMIA: Primary | ICD-10-CM

## 2025-07-14 NOTE — PROGRESS NOTES
Ramesh Early - Your thyroid labs suggest your dose is too high and your calcium levels are elevated. I need you to do follow up labs to see if I need to change your medications. You do not need to be fasting for these. - Dr. Garnica

## 2025-07-15 ENCOUNTER — LAB ENCOUNTER (OUTPATIENT)
Dept: LAB | Age: 70
End: 2025-07-15
Attending: FAMILY MEDICINE
Payer: MEDICARE

## 2025-07-15 LAB
ANION GAP SERPL CALC-SCNC: 7 MMOL/L (ref 0–18)
BUN BLD-MCNC: 20 MG/DL (ref 9–23)
BUN/CREAT SERPL: 13.8 (ref 10–20)
CALCIUM BLD-MCNC: 10.3 MG/DL (ref 8.7–10.4)
CHLORIDE SERPL-SCNC: 106 MMOL/L (ref 98–112)
CO2 SERPL-SCNC: 29 MMOL/L (ref 21–32)
CREAT BLD-MCNC: 1.45 MG/DL (ref 0.55–1.02)
EGFRCR SERPLBLD CKD-EPI 2021: 38 ML/MIN/1.73M2 (ref 60–?)
FASTING STATUS PATIENT QL REPORTED: NO
GLUCOSE BLD-MCNC: 112 MG/DL (ref 70–99)
OSMOLALITY SERPL CALC.SUM OF ELEC: 297 MOSM/KG (ref 275–295)
POTASSIUM SERPL-SCNC: 3.9 MMOL/L (ref 3.5–5.1)
SODIUM SERPL-SCNC: 142 MMOL/L (ref 136–145)
T4 FREE SERPL-MCNC: 1.7 NG/DL (ref 0.8–1.7)
THYROGLOB SERPL-MCNC: 17 U/ML (ref ?–60)
TSI SER-ACNC: 0.34 UIU/ML (ref 0.55–4.78)

## 2025-07-15 PROCEDURE — 84445 ASSAY OF TSI GLOBULIN: CPT | Performed by: FAMILY MEDICINE

## 2025-07-15 PROCEDURE — 36415 COLL VENOUS BLD VENIPUNCTURE: CPT | Performed by: FAMILY MEDICINE

## 2025-07-15 PROCEDURE — 86800 THYROGLOBULIN ANTIBODY: CPT | Performed by: FAMILY MEDICINE

## 2025-07-15 PROCEDURE — 84443 ASSAY THYROID STIM HORMONE: CPT | Performed by: FAMILY MEDICINE

## 2025-07-15 PROCEDURE — 84439 ASSAY OF FREE THYROXINE: CPT | Performed by: FAMILY MEDICINE

## 2025-07-15 PROCEDURE — 83970 ASSAY OF PARATHORMONE: CPT | Performed by: FAMILY MEDICINE

## 2025-07-15 PROCEDURE — 80048 BASIC METABOLIC PNL TOTAL CA: CPT | Performed by: FAMILY MEDICINE

## 2025-07-16 LAB — PTH-INTACT SERPL-MCNC: 53.9 PG/ML (ref 18.5–88)

## 2025-07-18 LAB — THY STIM IMMUNO: <0.1 IU/L

## 2025-07-23 NOTE — PROGRESS NOTES
Repeat thyroid levels were better - TSH is still low but negative for autoimmune disease so will continue to monitor. Calcium levels normalized and normal parathyroid hormones. But this time the kidney function decreased again. Make sure you are drinking plenty of fluids and recommend another medication called Farxiga. You can read up on it but it is used for diabetes and chronic kidney disease. There is good evidence that it improves kidney function. Let me know what you think and can send it in. - Dr. Garnica

## 2025-08-07 ENCOUNTER — HOSPITAL ENCOUNTER (OUTPATIENT)
Dept: ULTRASOUND IMAGING | Facility: HOSPITAL | Age: 70
Discharge: HOME | End: 2025-08-07
Attending: FAMILY MEDICINE

## 2025-08-07 ENCOUNTER — HOSPITAL ENCOUNTER (OUTPATIENT)
Dept: MAMMOGRAPHY | Facility: HOSPITAL | Age: 70
Discharge: HOME | End: 2025-08-07
Attending: FAMILY MEDICINE

## 2025-08-07 DIAGNOSIS — R92.8 ABNORMAL MAMMOGRAM: ICD-10-CM

## 2025-08-07 PROCEDURE — 76642 ULTRASOUND BREAST LIMITED: CPT | Performed by: FAMILY MEDICINE

## 2025-08-07 PROCEDURE — 77065 DX MAMMO INCL CAD UNI: CPT | Performed by: FAMILY MEDICINE

## 2025-08-07 PROCEDURE — 77061 BREAST TOMOSYNTHESIS UNI: CPT | Performed by: FAMILY MEDICINE

## 2025-08-13 ENCOUNTER — TELEPHONE (OUTPATIENT)
Facility: CLINIC | Age: 70
End: 2025-08-13

## 2025-08-21 ENCOUNTER — HOSPITAL ENCOUNTER (OUTPATIENT)
Dept: BONE DENSITY | Age: 70
Discharge: HOME | End: 2025-08-21
Attending: FAMILY MEDICINE

## 2025-08-21 DIAGNOSIS — Z78.0 POST-MENOPAUSAL: ICD-10-CM

## 2025-08-21 DIAGNOSIS — Z00.00 ENCOUNTER FOR ANNUAL HEALTH EXAMINATION: ICD-10-CM

## 2025-08-21 PROCEDURE — 77080 DXA BONE DENSITY AXIAL: CPT | Performed by: FAMILY MEDICINE

## (undated) DEVICE — 3M™ TEGADERM™ TRANSPARENT FILM DRESSING, 1626W, 4 IN X 4-3/4 IN (10 CM X 12 CM), 50 EACH/CARTON, 4 CARTON/CASE: Brand: 3M™ TEGADERM™

## (undated) DEVICE — SUTURE ETHILON 5-0 698G

## (undated) DEVICE — 3M™ STERI-STRIP™ BLEND TONE SKIN CLOSURES, B1557, TAN, 1/2 IN X 4 IN (12MM X 100MM), 6 STRIPS/ENVELOPE: Brand: 3M™ STERI-STRIP™

## (undated) DEVICE — DRAPE SRG 50X36IN HD TRBN STRL

## (undated) DEVICE — PEN 6\" SURGICAL MARKING PURPL

## (undated) DEVICE — SUTURE SILK 4-0 P-3

## (undated) DEVICE — OUTPATIENT: Brand: MEDLINE INDUSTRIES, INC.

## (undated) DEVICE — 6 ML SYRINGE LUER-LOCK TIP: Brand: MONOJECT

## (undated) DEVICE — CLIPPER BLADE 3M

## (undated) DEVICE — HEX-LOCKING BLADE ELECTRODE: Brand: EDGE

## (undated) DEVICE — GAMMEX® PI HYBRID SIZE 7, STERILE POWDER-FREE SURGICAL GLOVE, POLYISOPRENE AND NEOPRENE BLEND: Brand: GAMMEX

## (undated) DEVICE — UNDYED BRAIDED (POLYGLACTIN 910), SYNTHETIC ABSORBABLE SUTURE: Brand: COATED VICRYL

## (undated) DEVICE — SOL  .9 1000ML BTL

## (undated) DEVICE — DRAPE SHEET LG

## (undated) NOTE — LETTER
11/22/2021              Nneka Britt        [address]        [place] [address]         Dear [facility]els,      The report of the Biopsy done on 11/17/21 shows a Seborrheic keratosis.   This is a benign (not cancerous) growth, and requires no further t

## (undated) NOTE — LETTER
07/02/20        Nneka Christy [id number]      Dear Melony Washington records indicate that you have outstanding lab work and or testing that was ordered for you and has not yet been completed:  Orders Placed This Encounter      L

## (undated) NOTE — LETTER
[facility]  Authorization for Invasive Procedures  1.  I hereby authorize  Dr. Toby Shelby , my physician and whomever may be designated as the doctor's assistant, to perform the following operation and/or procedure Bilater performed for the purposes of advancing medicine, science, and/or education, provided my identity is not revealed. If the procedure has been videotaped, the physician/surgeon will obtain the original videotape.  The hospital will not be responsible for stor affirms that prior to the time of the procedure, I have explained to the patient and/or her legal representative, the risks and benefits involved in the proposed treatment and any reasonable alternative to the proposed treatment.  I have also explained the

## (undated) NOTE — LETTER
18      Patient: Surekha Connelly  : 1955 Visit date: 2018    Dear Venita Jauregui,      I examined your patient in consultation today. She has a basal cell carcinoma of the right cheek.   We will plan on wide excision under froz

## (undated) NOTE — LETTER
[address], [place]  Authorization for Invasive Procedures  1.  I hereby authorize  Dr. Radha Meeks, my physician and whomever may be designated as the doctor's assistant, to perform the following operation and/or procedure:Left Leg operations or procedures to be performed for the purposes of advancing medicine, science, and/or education, provided my identity is not revealed. If the procedure has been videotaped, the physician/surgeon will obtain the original videotape.  [facility]  My signature below affirms that prior to the time of the procedure, I have explained to the patient and/or her legal representative, the risks and benefits involved in the proposed treatment and any reasonable alternative to the proposed treatme

## (undated) NOTE — LETTER
08/11/20        Nneka Christy [phone]    Dear Matthew Green records indicate that you have outstanding lab work and or testing that was ordered for you and has not yet been completed:  Orders Placed This Encounter      Lip

## (undated) NOTE — LETTER
22    RE: Abad Crystal  : 1955    Dear Shelby Kemp    This patient has scheduled a self-referred/self-requested screening mammogram appointment with Noe Alvarez. This patient has identified you as their physician. If this is not correct, please contact the radiology administration office at   [phone] and the patient will be assigned to another physician. You will receive the results of this exam after it is completed.

## (undated) NOTE — LETTER
7/14/2025    Nneka Britt        [address]        [facility] [address]            Dear Nneka Britt,      Our records indicate that you are due for an appointment for a Colonoscopy with Antonio Blandon MD. Our doctors are booking out about 3-6 months in advance for procedures.     Please call our office to schedule this appointment.  Your medical well-being is important to us.    If your insurance requires a referral, please call your primary care office to request one.      Thank you,      The Physicians and Staff at [facility]